# Patient Record
Sex: FEMALE | Race: BLACK OR AFRICAN AMERICAN | NOT HISPANIC OR LATINO
[De-identification: names, ages, dates, MRNs, and addresses within clinical notes are randomized per-mention and may not be internally consistent; named-entity substitution may affect disease eponyms.]

---

## 2021-06-26 ENCOUNTER — TRANSCRIPTION ENCOUNTER (OUTPATIENT)
Age: 61
End: 2021-06-26

## 2022-10-27 ENCOUNTER — NON-APPOINTMENT (OUTPATIENT)
Age: 62
End: 2022-10-27

## 2024-04-16 ENCOUNTER — NON-APPOINTMENT (OUTPATIENT)
Age: 64
End: 2024-04-16

## 2024-04-17 ENCOUNTER — NON-APPOINTMENT (OUTPATIENT)
Age: 64
End: 2024-04-17

## 2024-04-18 ENCOUNTER — NON-APPOINTMENT (OUTPATIENT)
Age: 64
End: 2024-04-18

## 2024-04-18 ENCOUNTER — APPOINTMENT (OUTPATIENT)
Dept: ORTHOPEDIC SURGERY | Facility: CLINIC | Age: 64
End: 2024-04-18
Payer: MEDICARE

## 2024-04-18 DIAGNOSIS — M75.41 IMPINGEMENT SYNDROME OF RIGHT SHOULDER: ICD-10-CM

## 2024-04-18 PROCEDURE — 20610 DRAIN/INJ JOINT/BURSA W/O US: CPT | Mod: RT

## 2024-04-18 PROCEDURE — 99203 OFFICE O/P NEW LOW 30 MIN: CPT | Mod: 25

## 2024-04-25 DIAGNOSIS — Z84.1 FAMILY HISTORY OF DISORDERS OF KIDNEY AND URETER: ICD-10-CM

## 2024-04-25 DIAGNOSIS — Z82.49 FAMILY HISTORY OF ISCHEMIC HEART DISEASE AND OTHER DISEASES OF THE CIRCULATORY SYSTEM: ICD-10-CM

## 2024-04-25 DIAGNOSIS — Z86.79 PERSONAL HISTORY OF OTHER DISEASES OF THE CIRCULATORY SYSTEM: ICD-10-CM

## 2024-04-25 DIAGNOSIS — Z83.511 FAMILY HISTORY OF GLAUCOMA: ICD-10-CM

## 2024-04-25 DIAGNOSIS — Z83.438 FAMILY HISTORY OF OTHER DISORDER OF LIPOPROTEIN METABOLISM AND OTHER LIPIDEMIA: ICD-10-CM

## 2024-05-02 PROBLEM — M75.41 IMPINGEMENT SYNDROME OF RIGHT SHOULDER: Status: ACTIVE | Noted: 2024-04-18

## 2024-05-08 ENCOUNTER — APPOINTMENT (OUTPATIENT)
Dept: ORTHOPEDIC SURGERY | Facility: CLINIC | Age: 64
End: 2024-05-08

## 2024-05-30 NOTE — PROCEDURE
[de-identified] : Consent: At this time I have recommended a subacromial injection to the right shoulder.  The risks and benefits of the procedure were discussed with the patient in detail.  Upon verbal consent of the patient, we proceeded with the injection as noted below.   Indications:  Impingement, right shoulder : [employer] Drug name: Triamcinolone Acetonide Injectable Suspension USP NDC#: [address]-1 Lot#: [id number] Expiration date: 08/31/25    Procedure: After a sterile prep, the patient underwent a subacromial injection of 9 cc of 1% Lidocaine without epinephrine and 1 cc of triamcinolone acetonide (40 mg/mL) into the right shoulder.  The patient tolerated the procedure well.  There were no complications.

## 2024-05-30 NOTE — HISTORY OF PRESENT ILLNESS
[8] : a current pain level of 8/10 [de-identified] : Gris comes in today with complaints referable to her right shoulder.  It has been going on for about a month and getting a little worse recently.  She had x-rays taken in the hospital.  This injury is not work related or due to an automobile accident.  The patient states the pain is constant.  The patient describes the pain as throbbing.  [de-identified] : Tylenol

## 2024-05-30 NOTE — ADDENDUM
[FreeTextEntry1] : This note was written by Irma Potts on 04/24/2024 acting as scribe for Faustino Meza III, MD

## 2024-05-30 NOTE — PHYSICAL EXAM
[de-identified] : Left shoulder: Shoulder: Range of Motion in Degrees:                                 Claimant:   Normal:    Abduction (Active)   180   180 degrees    Abduction (Passive)   180   180 degrees    Forward elevation (Active):   180   180 degrees    Forward elevation (Passive):  180   180 degrees    External rotation (Active):   45   45 degrees    External rotation (Passive):   45   45 degrees    Internal rotation (Active):   L-1   L-1    Internal rotation (Passive):   L-1   L-1    No motor weakness to internal rotation, external rotation or abduction in the scapular plane.  Negative crank test.  Negative Geneva's test.  Negative Speeds test. Negative Yergason's test.  Negative cross arm test.  No tenderness to palpation at the AC joint. Negative Prasad sign.  Negative Neer sign.  Negative apprehension. Negative sulcus sign.  No gross neurological or vascular deficits distally.  Skin is intact.  No rashes, scars or lesions. 2+ radial and ulnar pulses. No extra-articular swelling or tenderness.   Right shoulder: Shoulder: Range of Motion in Degrees:                                     Claimant: Normal:  Abduction (Active)                   180                180 degrees  Abduction (Passive)   180                180 degrees  Forward elevation (Active):   180                180 degrees  Forward elevation (Passive):   180                180 degrees  External rotation (Active):    45                45 degrees  External rotation (Passive):    45                45 degrees  Internal rotation (Active):    L-1                L-1  Internal rotation (Passive):    L-1                L-1    No motor weakness to internal rotation, external rotation or abduction in the scapular plane.  Negative crank test.  Negative Geneva's test.  Negative Speeds test. Negative Yergason's test.  Negative cross arm test.  No tenderness to palpation at the AC joint. Positive Prasad sign.  Positive Neer sign. Negative apprehension. Negative sulcus sign.  No gross neurological or vascular deficits distally.  Skin is intact.  No rashes, scars or lesions. 2+ radial and ulnar pulses. No extra-articular swelling or tenderness.   [de-identified] : Gait: Normal    Station: Normal  [de-identified] : Appearance: Well-developed, well-nourished female  in no acute distress.  [de-identified] : Review of outside radiographs show no obvious osseous abnormalities.

## 2025-04-15 ENCOUNTER — NON-APPOINTMENT (OUTPATIENT)
Age: 65
End: 2025-04-15

## 2025-04-15 ENCOUNTER — INPATIENT (INPATIENT)
Facility: HOSPITAL | Age: 65
LOS: 1 days | Discharge: HOME | DRG: 103 | End: 2025-04-17
Attending: HOSPITALIST | Admitting: HOSPITALIST
Payer: MEDICARE

## 2025-04-15 VITALS — WEIGHT: 169.98 LBS

## 2025-04-15 DIAGNOSIS — R42 DIZZINESS AND GIDDINESS: ICD-10-CM

## 2025-04-15 LAB
ALBUMIN SERPL ELPH-MCNC: 3.5 G/DL — SIGNIFICANT CHANGE UP (ref 3.3–5)
ALP SERPL-CCNC: 87 U/L — SIGNIFICANT CHANGE UP (ref 40–120)
ALT FLD-CCNC: 18 U/L — SIGNIFICANT CHANGE UP (ref 12–78)
ANION GAP SERPL CALC-SCNC: 4 MMOL/L — LOW (ref 5–17)
APPEARANCE UR: CLEAR — SIGNIFICANT CHANGE UP
APTT BLD: 27.8 SEC — SIGNIFICANT CHANGE UP (ref 24.5–35.6)
AST SERPL-CCNC: 15 U/L — SIGNIFICANT CHANGE UP (ref 15–37)
BASOPHILS # BLD AUTO: 0.02 K/UL — SIGNIFICANT CHANGE UP (ref 0–0.2)
BASOPHILS NFR BLD AUTO: 0.3 % — SIGNIFICANT CHANGE UP (ref 0–2)
BILIRUB SERPL-MCNC: 0.6 MG/DL — SIGNIFICANT CHANGE UP (ref 0.2–1.2)
BILIRUB UR-MCNC: NEGATIVE — SIGNIFICANT CHANGE UP
BUN SERPL-MCNC: 9 MG/DL — SIGNIFICANT CHANGE UP (ref 7–23)
CALCIUM SERPL-MCNC: 9.8 MG/DL — SIGNIFICANT CHANGE UP (ref 8.5–10.1)
CHLORIDE SERPL-SCNC: 112 MMOL/L — HIGH (ref 96–108)
CO2 SERPL-SCNC: 25 MMOL/L — SIGNIFICANT CHANGE UP (ref 22–31)
COLOR SPEC: YELLOW — SIGNIFICANT CHANGE UP
CREAT SERPL-MCNC: 0.7 MG/DL — SIGNIFICANT CHANGE UP (ref 0.5–1.3)
DIFF PNL FLD: NEGATIVE — SIGNIFICANT CHANGE UP
EGFR: 95 ML/MIN/1.73M2 — SIGNIFICANT CHANGE UP
EGFR: 95 ML/MIN/1.73M2 — SIGNIFICANT CHANGE UP
EOSINOPHIL # BLD AUTO: 0.06 K/UL — SIGNIFICANT CHANGE UP (ref 0–0.5)
EOSINOPHIL NFR BLD AUTO: 0.8 % — SIGNIFICANT CHANGE UP (ref 0–6)
ETHANOL SERPL-MCNC: <10 MG/DL — SIGNIFICANT CHANGE UP (ref 0–10)
FLUAV AG NPH QL: SIGNIFICANT CHANGE UP
FLUBV AG NPH QL: SIGNIFICANT CHANGE UP
GLUCOSE SERPL-MCNC: 107 MG/DL — HIGH (ref 70–99)
GLUCOSE UR QL: NEGATIVE MG/DL — SIGNIFICANT CHANGE UP
HCT VFR BLD CALC: 45.1 % — HIGH (ref 34.5–45)
HGB BLD-MCNC: 15.4 G/DL — SIGNIFICANT CHANGE UP (ref 11.5–15.5)
IMM GRANULOCYTES # BLD AUTO: 0.02 K/UL — SIGNIFICANT CHANGE UP (ref 0–0.07)
IMM GRANULOCYTES NFR BLD AUTO: 0.3 % — SIGNIFICANT CHANGE UP (ref 0–0.9)
INR BLD: 0.91 RATIO — SIGNIFICANT CHANGE UP (ref 0.85–1.16)
KETONES UR-MCNC: NEGATIVE MG/DL — SIGNIFICANT CHANGE UP
LEUKOCYTE ESTERASE UR-ACNC: NEGATIVE — SIGNIFICANT CHANGE UP
LYMPHOCYTES # BLD AUTO: 1.43 K/UL — SIGNIFICANT CHANGE UP (ref 1–3.3)
LYMPHOCYTES NFR BLD AUTO: 19.5 % — SIGNIFICANT CHANGE UP (ref 13–44)
MCHC RBC-ENTMCNC: 28.9 PG — SIGNIFICANT CHANGE UP (ref 27–34)
MCHC RBC-ENTMCNC: 34.1 G/DL — SIGNIFICANT CHANGE UP (ref 32–36)
MCV RBC AUTO: 84.8 FL — SIGNIFICANT CHANGE UP (ref 80–100)
MONOCYTES # BLD AUTO: 0.59 K/UL — SIGNIFICANT CHANGE UP (ref 0–0.9)
MONOCYTES NFR BLD AUTO: 8 % — SIGNIFICANT CHANGE UP (ref 2–14)
NEUTROPHILS # BLD AUTO: 5.23 K/UL — SIGNIFICANT CHANGE UP (ref 1.8–7.4)
NEUTROPHILS NFR BLD AUTO: 71.1 % — SIGNIFICANT CHANGE UP (ref 43–77)
NITRITE UR-MCNC: NEGATIVE — SIGNIFICANT CHANGE UP
NRBC # BLD AUTO: 0 K/UL — SIGNIFICANT CHANGE UP (ref 0–0)
NRBC # FLD: 0 K/UL — SIGNIFICANT CHANGE UP (ref 0–0)
NRBC BLD AUTO-RTO: 0 /100 WBCS — SIGNIFICANT CHANGE UP (ref 0–0)
PH UR: 7 — SIGNIFICANT CHANGE UP (ref 5–8)
PLATELET # BLD AUTO: 181 K/UL — SIGNIFICANT CHANGE UP (ref 150–400)
PMV BLD: 12.9 FL — SIGNIFICANT CHANGE UP (ref 7–13)
POTASSIUM SERPL-MCNC: 3.7 MMOL/L — SIGNIFICANT CHANGE UP (ref 3.5–5.3)
POTASSIUM SERPL-SCNC: 3.7 MMOL/L — SIGNIFICANT CHANGE UP (ref 3.5–5.3)
PROT SERPL-MCNC: 7.5 GM/DL — SIGNIFICANT CHANGE UP (ref 6–8.3)
PROT UR-MCNC: NEGATIVE MG/DL — SIGNIFICANT CHANGE UP
PROTHROM AB SERPL-ACNC: 10.8 SEC — SIGNIFICANT CHANGE UP (ref 9.9–13.4)
RBC # BLD: 5.32 M/UL — HIGH (ref 3.8–5.2)
RBC # FLD: 13.5 % — SIGNIFICANT CHANGE UP (ref 10.3–14.5)
RSV RNA NPH QL NAA+NON-PROBE: SIGNIFICANT CHANGE UP
SARS-COV-2 RNA SPEC QL NAA+PROBE: SIGNIFICANT CHANGE UP
SODIUM SERPL-SCNC: 141 MMOL/L — SIGNIFICANT CHANGE UP (ref 135–145)
SOURCE RESPIRATORY: SIGNIFICANT CHANGE UP
SP GR SPEC: 1.02 — SIGNIFICANT CHANGE UP (ref 1–1.03)
TROPONIN I, HIGH SENSITIVITY RESULT: 3.68 NG/L — SIGNIFICANT CHANGE UP
UROBILINOGEN FLD QL: 0.2 MG/DL — SIGNIFICANT CHANGE UP (ref 0.2–1)
WBC # BLD: 7.35 K/UL — SIGNIFICANT CHANGE UP (ref 3.8–10.5)
WBC # FLD AUTO: 7.35 K/UL — SIGNIFICANT CHANGE UP (ref 3.8–10.5)

## 2025-04-15 PROCEDURE — 80061 LIPID PANEL: CPT

## 2025-04-15 PROCEDURE — 71045 X-RAY EXAM CHEST 1 VIEW: CPT | Mod: 26

## 2025-04-15 PROCEDURE — 70450 CT HEAD/BRAIN W/O DYE: CPT | Mod: 26,XU

## 2025-04-15 PROCEDURE — 83735 ASSAY OF MAGNESIUM: CPT

## 2025-04-15 PROCEDURE — 70551 MRI BRAIN STEM W/O DYE: CPT | Mod: MC

## 2025-04-15 PROCEDURE — 80048 BASIC METABOLIC PNL TOTAL CA: CPT

## 2025-04-15 PROCEDURE — 85652 RBC SED RATE AUTOMATED: CPT

## 2025-04-15 PROCEDURE — 99291 CRITICAL CARE FIRST HOUR: CPT

## 2025-04-15 PROCEDURE — 85027 COMPLETE CBC AUTOMATED: CPT

## 2025-04-15 PROCEDURE — 99284 EMERGENCY DEPT VISIT MOD MDM: CPT

## 2025-04-15 PROCEDURE — 99285 EMERGENCY DEPT VISIT HI MDM: CPT

## 2025-04-15 PROCEDURE — 70496 CT ANGIOGRAPHY HEAD: CPT | Mod: 26

## 2025-04-15 PROCEDURE — 99222 1ST HOSP IP/OBS MODERATE 55: CPT

## 2025-04-15 PROCEDURE — 0042T: CPT

## 2025-04-15 PROCEDURE — 84100 ASSAY OF PHOSPHORUS: CPT

## 2025-04-15 PROCEDURE — 36415 COLL VENOUS BLD VENIPUNCTURE: CPT

## 2025-04-15 PROCEDURE — 70498 CT ANGIOGRAPHY NECK: CPT | Mod: 26

## 2025-04-15 PROCEDURE — 93306 TTE W/DOPPLER COMPLETE: CPT

## 2025-04-15 PROCEDURE — 70551 MRI BRAIN STEM W/O DYE: CPT | Mod: 26

## 2025-04-15 RX ORDER — AMLODIPINE BESYLATE 10 MG/1
1 TABLET ORAL
Refills: 0 | DISCHARGE

## 2025-04-15 RX ORDER — ACETAMINOPHEN 500 MG/5ML
650 LIQUID (ML) ORAL EVERY 6 HOURS
Refills: 0 | Status: DISCONTINUED | OUTPATIENT
Start: 2025-04-15 | End: 2025-04-17

## 2025-04-15 RX ORDER — ASPIRIN 325 MG
81 TABLET ORAL DAILY
Refills: 0 | Status: DISCONTINUED | OUTPATIENT
Start: 2025-04-16 | End: 2025-04-17

## 2025-04-15 RX ORDER — ATORVASTATIN CALCIUM 80 MG/1
80 TABLET, FILM COATED ORAL AT BEDTIME
Refills: 0 | Status: DISCONTINUED | OUTPATIENT
Start: 2025-04-15 | End: 2025-04-17

## 2025-04-15 RX ORDER — BRIMONIDINE TARTRATE 1.5 MG/ML
1 SOLUTION/ DROPS OPHTHALMIC
Refills: 0 | Status: DISCONTINUED | OUTPATIENT
Start: 2025-04-15 | End: 2025-04-17

## 2025-04-15 RX ORDER — ONDANSETRON HCL/PF 4 MG/2 ML
4 VIAL (ML) INJECTION ONCE
Refills: 0 | Status: COMPLETED | OUTPATIENT
Start: 2025-04-15 | End: 2025-04-15

## 2025-04-15 RX ORDER — LATANOPROST PF 0.05 MG/ML
1 SOLUTION/ DROPS OPHTHALMIC AT BEDTIME
Refills: 0 | Status: DISCONTINUED | OUTPATIENT
Start: 2025-04-15 | End: 2025-04-17

## 2025-04-15 RX ORDER — MECLIZINE HCL 12.5 MG
12.5 TABLET ORAL EVERY 8 HOURS
Refills: 0 | Status: DISCONTINUED | OUTPATIENT
Start: 2025-04-15 | End: 2025-04-17

## 2025-04-15 RX ORDER — BRIMONIDINE TARTRATE 1.5 MG/ML
1 SOLUTION/ DROPS OPHTHALMIC
Refills: 0 | DISCHARGE

## 2025-04-15 RX ORDER — LATANOPROST PF 0.05 MG/ML
1 SOLUTION/ DROPS OPHTHALMIC
Refills: 0 | DISCHARGE

## 2025-04-15 RX ORDER — LORAZEPAM 4 MG/ML
0.5 VIAL (ML) INJECTION ONCE
Refills: 0 | Status: DISCONTINUED | OUTPATIENT
Start: 2025-04-15 | End: 2025-04-15

## 2025-04-15 RX ORDER — ASPIRIN 325 MG
324 TABLET ORAL ONCE
Refills: 0 | Status: COMPLETED | OUTPATIENT
Start: 2025-04-15 | End: 2025-04-15

## 2025-04-15 RX ORDER — AMLODIPINE BESYLATE 10 MG/1
2.5 TABLET ORAL DAILY
Refills: 0 | Status: DISCONTINUED | OUTPATIENT
Start: 2025-04-15 | End: 2025-04-17

## 2025-04-15 RX ADMIN — Medication 4 MILLIGRAM(S): at 11:51

## 2025-04-15 RX ADMIN — Medication 650 MILLIGRAM(S): at 21:00

## 2025-04-15 RX ADMIN — ATORVASTATIN CALCIUM 80 MILLIGRAM(S): 80 TABLET, FILM COATED ORAL at 21:03

## 2025-04-15 RX ADMIN — Medication 12.5 MILLIGRAM(S): at 16:59

## 2025-04-15 RX ADMIN — Medication 75 MILLILITER(S): at 17:00

## 2025-04-15 RX ADMIN — Medication 650 MILLIGRAM(S): at 20:30

## 2025-04-15 RX ADMIN — BRIMONIDINE TARTRATE 1 DROP(S): 1.5 SOLUTION/ DROPS OPHTHALMIC at 22:26

## 2025-04-15 RX ADMIN — Medication 0.5 MILLIGRAM(S): at 21:03

## 2025-04-15 RX ADMIN — Medication 324 MILLIGRAM(S): at 14:31

## 2025-04-15 RX ADMIN — Medication 1000 MILLILITER(S): at 11:51

## 2025-04-15 RX ADMIN — LATANOPROST PF 1 DROP(S): 0.05 SOLUTION/ DROPS OPHTHALMIC at 22:26

## 2025-04-15 NOTE — PATIENT PROFILE ADULT - FALL HARM RISK - HARM RISK INTERVENTIONS
Assistance with ambulation/Assistance OOB with selected safe patient handling equipment/Communicate Risk of Fall with Harm to all staff/Monitor gait and stability/Reinforce activity limits and safety measures with patient and family/Sit up slowly, dangle for a short time, stand at bedside before walking/Tailored Fall Risk Interventions/Visual Cue: Yellow wristband and red socks/Bed in lowest position, wheels locked, appropriate side rails in place/Call bell, personal items and telephone in reach/Instruct patient to call for assistance before getting out of bed or chair/Non-slip footwear when patient is out of bed/[place] to call system/Physically safe environment - no spills, clutter or unnecessary equipment/Purposeful Proactive Rounding/Room/bathroom lighting operational, light cord in reach

## 2025-04-15 NOTE — H&P ADULT - ASSESSMENT
#Dizziness : BPPV vs VBI  - admit to medicine/tele  - CTA noted, incidental small saccular aneurysm noted. No acute cva or lvo  - ASA + statin  - TTE  - MRI  - Neuro checks q4h  - BGM q6h x 24 hours  - lipid/a1c  - Fall precautions  - meclizine prn  - gentle ivf  - DVT px: SCDs      Time spent: > 75 min spent during this encounter including  but not limited to chart review, labs/imaging, coordination of care and discussion with consultants.  D/W ED provider

## 2025-04-15 NOTE — H&P ADULT - NSHPREVIEWOFSYSTEMS_GEN_ALL_CORE
REVIEW OF SYSTEMS:    CONSTITUTIONAL: No weakness, fevers or chills. + dizziness  EYES/ENT: No visual changes;  No vertigo or throat pain   NECK: No pain or stiffness  RESPIRATORY: No cough, wheezing, hemoptysis; No shortness of breath  CARDIOVASCULAR: No chest pain or palpitations  GASTROINTESTINAL: No abdominal or epigastric pain. No nausea, vomiting, or hematemesis; No diarrhea or constipation. No melena or hematochezia.  GENITOURINARY: No dysuria, frequency or hematuria  NEUROLOGICAL: No numbness or weakness  SKIN: No itching, burning, rashes, or lesions   All other review of systems is negative unless indicated above

## 2025-04-15 NOTE — H&P ADULT - NSHPLABSRESULTS_GEN_ALL_CORE
LABS: All Labs Reviewed:                        15.4   7.35  )-----------( 181      ( 15 Apr 2025 10:56 )             45.1     04-15    141  |  112[H]  |  9   ----------------------------<  107[H]  3.7   |  25  |  0.70    Ca    9.8      15 Apr 2025 10:56    TPro  7.5  /  Alb  3.5  /  TBili  0.6  /  DBili  x   /  AST  15  /  ALT  18  /  AlkPhos  87  04-15    PT/INR - ( 15 Apr 2025 10:56 )   PT: 10.8 sec;   INR: 0.91 ratio         PTT - ( 15 Apr 2025 10:56 )  PTT:27.8 sec          Blood Culture:           < from: CT Angio Neck Stroke Protocol w/ IV Cont (04.15.25 @ 11:10) >        IMPRESSION:    1.   RIGHT CAROTID SYSTEM:    No hemodynamically significant stenosis.    2.   LEFT CAROTID SYSTEM:     No hemodynamically significant stenosis.    3.   VERTEBRAL CIRCULATION:    Patent.    4.  ANTERIOR INTRACRANIAL CIRCULATION:     Right internal carotid tiny   saccular aneurysm (versus infundibulum of a branch vessel not otherwise   identified).    5.  POSTERIOR INTRACRANIAL CIRCULATION:    Unremarkable.    6.  No large vessel occlusion.    7.  BRAIN PERFUSION:   No acute infarction of the brain is convincingly   demonstrated.    --- End of Report ---      < end of copied text >

## 2025-04-15 NOTE — ED ADULT NURSE NOTE - OBJECTIVE STATEMENT
65 y/o F presenting to ED with c/o feeling like she was off balanced to the right while walking, feeling of spinning sensation in her head, photosensitivity, nauseous, dizziness. symptoms onset 0800. denies blurry vision, difficulty speaking, facial droop, numbness/tingling. denies previous episodes of this. non-smoker. currently endorsing a headache described as "not too bad". denies migraine hx. wearing cardiac monitor tracing sinus bradycardia. Hx: high blood pressure, glaucoma. BGL 95 upon arrival to ED. code stroke called 1053, patient taken directly to CT scan.

## 2025-04-15 NOTE — ED PROVIDER NOTE - OBJECTIVE STATEMENT
65 y/o female with a PMHx of hypertension and glaucoma presents to the ED c/o dizziness. Pt states the room is spinning,  last known time was 8am this morning. Prior to 8am pt was at work when the room started spinning feeling nauseous, difficulty ambulating and blurred vision. Pt denies any trauma, notes she had a headaches no focal weakness, numbness or tingling. Pt is accompanied by family and hx limited secondary to acuity. See mdm

## 2025-04-15 NOTE — ED PROVIDER NOTE - CLINICAL SUMMARY MEDICAL DECISION MAKING FREE TEXT BOX
63 y/o F presents for dizziness. Stroke note activated for vertigo, visual symptoms differential includes stroke, ICH, labs, urine, flu swab, CXR, EKG, and reassess. 65 y/o female with a PMHx of hypertension and glaucoma presents to the ED c/o dizziness. Pt states the room is spinning,  last known time was 8am this morning. Prior to 8am pt was at work when the room started spinning feeling nauseous, difficulty ambulating and blurred vision. Pt denies any trauma, notes she had a headaches no focal weakness, numbness or tingling. Pt is accompanied by family and hx limited secondary to acuity.    MDM: 65 y/o F presents for dizziness. Stroke note activated for vertigo, visual symptoms differential includes stroke, ICH, labs, urine, flu swab, CXR, EKG, and reassess.

## 2025-04-15 NOTE — STROKE CODE NOTE - NSMDCONSULT QTN_Y FT
CC: code stroke    HPI: 63 y/o female with h/o HTN, glaucoma presents to the ED with c/o dizziness.  Pt states her head is spinning.  She woke up at 6:30 fine, went to work and at 8am the room started spinning, felt nauseous, felt unsteady and leaning more to the L with blurred vision.  Denies focal weakness, numbness, dysarthria, facial droop, diplopia, tinnitus, ear pain, recent travel, recent illness.  +photohobia, +increased stress as she takes care of her ill brother.  Code stroke was called in the ED.  NIHSS 0.  CT imaging was neg for acute stroke/bleed, LVO, perfusion mismatch.  Incidental DAVID tiny saccular aneurysm vs infundibulum. The patient was not a candidate for IV thrombolytics because of no measurable deficits, and not a thrombectomy candidate because no LVO.          PAST MEDICAL & SURGICAL HISTORY:  HTN  Glaucoma    FAMILY HISTORY: Brother 69  brain aneurysm, Sister 56 brain aneurysm      Social Hx:  Nonsmoker, no drug or alcohol use    MEDICATIONS  (STANDING):  AMlodipine  glaucoma eye drops  D3    Allergies  latex (Unknown)  penicillin (Rash)      ROS: Pertinent positives in HPI, all other ROS were reviewed and are negative.      Vital Signs Last 24 Hrs  T(C): 36.8 (15 Apr 2025 10:52), Max: 36.8 (15 Apr 2025 10:52)  T(F): 98.2 (15 Apr 2025 10:52), Max: 98.2 (15 Apr 2025 10:52)  HR: 55 (15 Apr 2025 11:35) (55 - 58)  BP: 166/107 (15 Apr 2025 11:35) (166/107 - 167/94)  BP(mean): 119 (15 Apr 2025 11:35) (110 - 119)  RR: 16 (15 Apr 2025 11:35) (16 - 18)  SpO2: 98% (15 Apr 2025 11:35) (97% - 98%)    Parameters below as of 15 Apr 2025 11:35  Patient On (Oxygen Delivery Method): room air      GEN:  Constitutional: awake, NAD, eyes closed  HEAD: Normocephalic  Neck: Supple.  Extremities:  no edema  Musculoskeletal: no abnormal movements  Skin: No rashes    Neurological exam:  HF: A x O x 3. Appropriately interactive, normal affect. Speech fluent, No Aphasia or paraphasic errors. Naming /repetition intact   CN: EMBER, EOMI, VFF, nystagmus L, +saccade on HINTS, facial sensation normal, no NLFD, tongue midline, Palate moves equally  Motor: No pronator drift, Strength 5/5 in all 4 ext, normal bulk and tone, no tremor, rigidity or bradykinesia.    Sens: Intact to light touch  Reflexes: Symmetric and normal, downgoing toes b/l  Coord:  No FNFA, dysmetria, MICHAEL intact   Gait/Balance: Neg Romberg's, took a few steps, felt dizzy/vertigo, but no ataxia observed    NIHSS: 0          Labs:   04-15    141  |  112[H]  |  9   ----------------------------<  107[H]  3.7   |  25  |  0.70    Ca    9.8      15 Apr 2025 10:56    TPro  7.5  /  Alb  3.5  /  TBili  0.6  /  DBili  x   /  AST  15  /  ALT  18  /  AlkPhos  87  04-15                              15.4   7.35  )-----------( 181      ( 15 Apr 2025 10:56 )             45.1       Radiology:  < from: CT Angio Neck Stroke Protocol w/ IV Cont (04.15.25 @ 11:10) >      IMPRESSION:    1.   RIGHT CAROTID SYSTEM:    No hemodynamically significant stenosis.    2.   LEFT CAROTID SYSTEM:     No hemodynamically significant stenosis.    3.   VERTEBRAL CIRCULATION:    Patent.    4.  ANTERIOR INTRACRANIAL CIRCULATION:     Right internal carotid tiny   saccular aneurysm (versus infundibulum of a branch vessel not otherwise   identified).    5.  POSTERIOR INTRACRANIAL CIRCULATION:    Unremarkable.    6.  No large vessel occlusion.    7.  BRAIN PERFUSION:   No acute infarction of the brain is convincingly   demonstrated.        A/P: 63 y/o female with h/o HTN, glaucoma presents to the ED with c/o vertigo, HA, blurry vision, unsteady gait.  code stroke called.  NIHSS 0.  CT imaging was neg for stroke, bleed, LVO, perfusion mismatch.   Incidental DAVID tiny saccular aneurysm vs infundibulum. The patient was not a candidate for IV thrombolytics because of no measurable deficits, and not a thrombectomy candidate because no LVO.  PE no focal deficits, +L nystagmus, +saccade on HINTS.      #Likely peripheral vertigo/vertiginous migraine-r/o cerebellar stroke      Recommendations  -Monitor on tele  -Neurochecks/VS q 4  -Gradual normotension  -Load with ASA  -HD statin  -DVT prophylaxis  -MRI brain  -Dysphagia screen stat and if fails speech and swallow eval  -PT eval  -Neurology to follow.  Please page or call for any questions CC: code stroke    HPI:   65 y/o female with PMHx of HTN, glaucoma presents to the ED with c/o dizziness. Pt states she woke up at 6:30 fine, went to work and at 8 am the room started spinning, felt nauseous, felt unsteady and was leaning more to the L, had blurred vision and some photophobia.  Denies focal weakness, numbness, dysarthria, facial droop, diplopia, tinnitus, ear pain, recent travel, recent illness. Has increased stress as she takes care of her ill brother.  Code stroke was called in the ED. NIHSS 0.  CT imaging was neg for acute stroke/bleed, CTA no LVO, CTP no perfusion mismatch.  Incidental DAVID tiny saccular aneurysm vs infundibulum. The patient was not a candidate for IV thrombolytics because of no measurable deficits, and not a thrombectomy candidate because no LVO.          PAST MEDICAL & SURGICAL HISTORY:  HTN  Glaucoma    FAMILY HISTORY: Brother 69  brain aneurysm, Sister 56 brain aneurysm      Social Hx:  Nonsmoker, no drug or alcohol use    MEDICATIONS  (STANDING):  AMlodipine  glaucoma eye drops  D3    Allergies  latex (Unknown)  penicillin (Rash)      ROS: Pertinent positives in HPI, all other ROS were reviewed and are negative.      Vital Signs Last 24 Hrs  T(C): 36.8 (15 Apr 2025 10:52), Max: 36.8 (15 Apr 2025 10:52)  T(F): 98.2 (15 Apr 2025 10:52), Max: 98.2 (15 Apr 2025 10:52)  HR: 55 (15 Apr 2025 11:35) (55 - 58)  BP: 166/107 (15 Apr 2025 11:35) (166/107 - 167/94)  BP(mean): 119 (15 Apr 2025 11:35) (110 - 119)  RR: 16 (15 Apr 2025 11:35) (16 - 18)  SpO2: 98% (15 Apr 2025 11:35) (97% - 98%)    Parameters below as of 15 Apr 2025 11:35  Patient On (Oxygen Delivery Method): room air      GEN:  Constitutional: awake, NAD, eyes closed  HEAD: Normocephalic  Neck: Supple.  Extremities:  no edema  Musculoskeletal: no abnormal movements  Skin: No rashes    Neurological exam:  HF: A x O x 3. Appropriately interactive, normal affect. Speech fluent, No Aphasia or paraphasic errors. Naming /repetition intact   CN: EMBER, EOMI, VFF, left beating nystagmus +saccade on HINTS, facial sensation normal, no NLFD, tongue midline, Palate moves equally  Motor: No pronator drift, Strength 5/5 in all 4 ext, normal bulk and tone, no tremor, rigidity or bradykinesia.    Sens: Intact to light touch  Reflexes: Symmetric and normal, downgoing toes b/l  Coord:  No FNFA, dysmetria, MICHAEL intact   Gait/Balance: Neg Romberg's, took a few steps, felt dizzy but no ataxia observed    NIHSS: 0          Labs:   04-15    141  |  112[H]  |  9   ----------------------------<  107[H]  3.7   |  25  |  0.70    Ca    9.8      15 Apr 2025 10:56    TPro  7.5  /  Alb  3.5  /  TBili  0.6  /  DBili  x   /  AST  15  /  ALT  18  /  AlkPhos  87  04-15                              15.4   7.35  )-----------( 181      ( 15 Apr 2025 10:56 )             45.1       Radiology:  < from: CT Angio Neck Stroke Protocol w/ IV Cont (04.15.25 @ 11:10) >      IMPRESSION:    1.   RIGHT CAROTID SYSTEM:    No hemodynamically significant stenosis.    2.   LEFT CAROTID SYSTEM:     No hemodynamically significant stenosis.    3.   VERTEBRAL CIRCULATION:    Patent.    4.  ANTERIOR INTRACRANIAL CIRCULATION:     Right internal carotid tiny   saccular aneurysm (versus infundibulum of a branch vessel not otherwise   identified).    5.  POSTERIOR INTRACRANIAL CIRCULATION:    Unremarkable.    6.  No large vessel occlusion.    7.  BRAIN PERFUSION:   No acute infarction of the brain is convincingly   demonstrated.        A/P:  -----  65 y/o female with PMHx of HTN, glaucoma presents to the ED with c/o dizziness. Pt woke up at 6:30 fine, went to work, at 8 am the room started spinning, felt nauseous, unsteady, was leaning  to the L, had blurred vision and some photophobia.  Code stroke was called in the ED. NIHSS 0.  CT head neg for acute stroke/bleed, CTA no LVO, CTP no perfusion mismatch.  Incidental DAVID tiny saccular aneurysm vs infundibulum. The patient was not a candidate for IV thrombolytics because of no measurable deficits, and not a thrombectomy candidate because no LVO.   PE no focal deficits, L beating nystagmus, +saccade on HINTS.      #Likely peripheral vertigo/vertiginous migraine; rule out brain stem CVA - given HTN      Recommendations  -Monitor on tele  -Neurochecks/VS q 4  -Gradual normotension  -Load with ASA  -HD statin  -DVT prophylaxis  -MRI brain  -Dysphagia screen stat and if fails speech and swallow eval  -PT eval  -Neurology to follow.     Neurology attending  ------------------------------  Patient seen and examined, was involved in decision making  Discussed with Dr. Marroquin

## 2025-04-15 NOTE — ED PROVIDER NOTE - PROGRESS NOTE DETAILS
[facility] Dr. Ngo   Pt CT. labs and urine unremarkable. Pt will be admitted for MRI

## 2025-04-15 NOTE — H&P ADULT - NSHPPHYSICALEXAM_GEN_ALL_CORE
ICU Vital Signs Last 24 Hrs  T(C): 36.6 (15 Apr 2025 13:56), Max: 36.8 (15 Apr 2025 10:52)  T(F): 97.8 (15 Apr 2025 13:56), Max: 98.2 (15 Apr 2025 10:52)  HR: 64 (15 Apr 2025 15:00) (52 - 67)  BP: 163/86 (15 Apr 2025 15:00) (157/85 - 167/94)  BP(mean): 109 (15 Apr 2025 15:00) (108 - 119)  ABP: --  ABP(mean): --  RR: 17 (15 Apr 2025 15:00) (15 - 18)  SpO2: 97% (15 Apr 2025 15:00) (97% - 100%)    O2 Parameters below as of 15 Apr 2025 15:00  Patient On (Oxygen Delivery Method): room air            PHYSICAL EXAM:    Constitutional: NAD, awake and alert  HEENT: PERR, EOMI, Normal Hearing, MMM  Neck: Soft and supple, No LAD, No JVD  Respiratory: Breath sounds are clear bilaterally, No wheezing, rales or rhonchi  Cardiovascular: S1 and S2, regular rate and rhythm, no Murmurs, gallops or rubs  Gastrointestinal: Bowel Sounds present, soft, nontender, nondistended, no guarding, no rebound  Extremities: No peripheral edema  Vascular: 2+ peripheral pulses  Neurological: A/O x 3, no focal deficits  Musculoskeletal: 5/5 strength b/l upper and lower extremities; motor 5/5 throughout. normal sensory exam; normal cerebellar exam; cn 2-12 in tact  Skin: No rashes

## 2025-04-15 NOTE — H&P ADULT - HISTORY OF PRESENT ILLNESS
"63 y/o female with PMHx of HTN, glaucoma presents to the ED with c/o dizziness. Pt states she woke up at 6:30 fine, went to work and at 8 am the room started spinning, felt nauseous, felt unsteady and was leaning more to the L, had blurred vision and some photophobia.  Denies focal weakness, numbness, dysarthria, facial droop, diplopia, tinnitus, ear pain, recent travel, recent illness. Has increased stress as she takes care of her ill brother.  Code stroke was called in the ED. NIHSS 0.  CT imaging was neg for acute stroke/bleed, CTA no LVO, CTP no perfusion mismatch.  Incidental DAVID tiny saccular aneurysm vs infundibulum. The patient was not a candidate for IV thrombolytics because of no measurable deficits, and not a thrombectomy candidate because no LVO.  "    Pt seen and examined with family at bedside - states her dizziness began at 0800 today. Feels like the room is spinning. Worse with movt but still present when she is laying down  No neurological deficits. No facial droop, no dysarthria or aphasia. No blurry vision. No recent uri. No head trauma. No neck pain.   Neuro exam completely non focal in ED    EKG: SB, no stt changes          PAST MEDICAL & SURGICAL HISTORY:  HTN  Glaucoma    FAMILY HISTORY: Brother 69  brain aneurysm, Sister 56 brain aneurysm      Social Hx:  Nonsmoker, no drug or alcohol use

## 2025-04-15 NOTE — ED PROVIDER NOTE - NIH STROKE SCALE: 9. BEST LANGUAGE, QM
Reason for visit: Neurogenic bladder     Relevant information: Chart reviewed. Pt scheduled to establish care.      Records/imaging/labs/orders: Pt is due for imaging - last imaging that included kidneys is dated 11/09/21.Renal US orders placed.    Pt called: No answer on call attempt. Message routed to scheduling team to schedule imaging and reschedule pt to Yvonne May NP as this is likely not a surgical referral.     At Rooming: alen Perdomo CMA  01/12/24  10:55 AM     (0) No aphasia; normal

## 2025-04-15 NOTE — ED ADULT TRIAGE NOTE - CHIEF COMPLAINT QUOTE
Patient presents to the ER with complaints of dizziness, feeling like the room is spinning, nausea, headache, blurry vision since 0800 today. Hx: high blood pressure, glaucoma. Denies blood thinner use. Blood glucose 95. MD Amanda sadler in triage, code stroke called 1053.

## 2025-04-15 NOTE — ED PROVIDER NOTE - NIH STROKE SCALE: TOTAL
0
pt reports 2 hrs PTA as she was trying to fall asleep she felt rapid heart beat, bilateral hand tingling and anxious. Pt reports symptoms improving since arrival. Pt states "I just need help sleeping."

## 2025-04-15 NOTE — ED PROVIDER NOTE - PHYSICAL EXAMINATION
Constitutional: NAD, alert, verbal  HEENT: NCAT, EOMi, PERRL  Cardiac: RRR no MRG  Resp: clear, no wheezing or crackles  GI: ab soft ntnd, no r/g  MSK/Ext: no edema  Neuro: no facial droop, upper and lower ext 5/5 strength, sensation grossly intact, MICHAEL/FTN normal, no drift, no slurred speech  Skin: No rashes

## 2025-04-15 NOTE — ED ADULT NURSE NOTE - NSFALLRISKINTERV_ED_ALL_ED

## 2025-04-15 NOTE — ED ADULT NURSE NOTE - NS ED NURSE LEVEL OF CONSCIOUSNESS AFFECT
Pt here with right knee pain, no known injury , just standing and felt a pop.         Triage Assessment     Row Name 05/20/23 2018       Triage Assessment (Adult)    Airway WDL WDL       Respiratory WDL    Respiratory WDL WDL               Calm/Appropriate

## 2025-04-16 ENCOUNTER — RESULT REVIEW (OUTPATIENT)
Age: 65
End: 2025-04-16

## 2025-04-16 LAB
ANION GAP SERPL CALC-SCNC: 2 MMOL/L — LOW (ref 5–17)
BUN SERPL-MCNC: 10 MG/DL — SIGNIFICANT CHANGE UP (ref 7–23)
CALCIUM SERPL-MCNC: 9.2 MG/DL — SIGNIFICANT CHANGE UP (ref 8.5–10.1)
CHLORIDE SERPL-SCNC: 115 MMOL/L — HIGH (ref 96–108)
CHOLEST SERPL-MCNC: 172 MG/DL — SIGNIFICANT CHANGE UP
CO2 SERPL-SCNC: 26 MMOL/L — SIGNIFICANT CHANGE UP (ref 22–31)
CREAT SERPL-MCNC: 0.73 MG/DL — SIGNIFICANT CHANGE UP (ref 0.5–1.3)
EGFR: 91 ML/MIN/1.73M2 — SIGNIFICANT CHANGE UP
EGFR: 91 ML/MIN/1.73M2 — SIGNIFICANT CHANGE UP
GLUCOSE SERPL-MCNC: 98 MG/DL — SIGNIFICANT CHANGE UP (ref 70–99)
HDLC SERPL-MCNC: 52 MG/DL — SIGNIFICANT CHANGE UP
LDLC SERPL-MCNC: 100 MG/DL — HIGH
LIPID PNL WITH DIRECT LDL SERPL: 100 MG/DL — HIGH
NONHDLC SERPL-MCNC: 121 MG/DL — SIGNIFICANT CHANGE UP
POTASSIUM SERPL-MCNC: 3.9 MMOL/L — SIGNIFICANT CHANGE UP (ref 3.5–5.3)
POTASSIUM SERPL-SCNC: 3.9 MMOL/L — SIGNIFICANT CHANGE UP (ref 3.5–5.3)
SODIUM SERPL-SCNC: 143 MMOL/L — SIGNIFICANT CHANGE UP (ref 135–145)
TRIGL SERPL-MCNC: 114 MG/DL — SIGNIFICANT CHANGE UP

## 2025-04-16 PROCEDURE — 99233 SBSQ HOSP IP/OBS HIGH 50: CPT

## 2025-04-16 PROCEDURE — 99232 SBSQ HOSP IP/OBS MODERATE 35: CPT

## 2025-04-16 PROCEDURE — 93306 TTE W/DOPPLER COMPLETE: CPT | Mod: 26

## 2025-04-16 RX ORDER — BUTALBITAL, ACETAMINOPHEN AND CAFFEINE 50; 325; 40 MG/1; MG/1; MG/1
1 TABLET ORAL ONCE
Refills: 0 | Status: COMPLETED | OUTPATIENT
Start: 2025-04-16 | End: 2025-04-16

## 2025-04-16 RX ORDER — DEXAMETHASONE 0.5 MG/1
10 TABLET ORAL ONCE
Refills: 0 | Status: COMPLETED | OUTPATIENT
Start: 2025-04-16 | End: 2025-04-16

## 2025-04-16 RX ORDER — METOCLOPRAMIDE HCL 10 MG
10 TABLET ORAL ONCE
Refills: 0 | Status: COMPLETED | OUTPATIENT
Start: 2025-04-16 | End: 2025-04-16

## 2025-04-16 RX ORDER — DEXAMETHASONE 0.5 MG/1
10 TABLET ORAL ONCE
Refills: 0 | Status: DISCONTINUED | OUTPATIENT
Start: 2025-04-16 | End: 2025-04-16

## 2025-04-16 RX ORDER — MAGNESIUM SULFATE 500 MG/ML
1 SYRINGE (ML) INJECTION ONCE
Refills: 0 | Status: COMPLETED | OUTPATIENT
Start: 2025-04-16 | End: 2025-04-16

## 2025-04-16 RX ADMIN — BUTALBITAL, ACETAMINOPHEN AND CAFFEINE 1 TABLET(S): 50; 325; 40 TABLET ORAL at 13:30

## 2025-04-16 RX ADMIN — LATANOPROST PF 1 DROP(S): 0.05 SOLUTION/ DROPS OPHTHALMIC at 21:16

## 2025-04-16 RX ADMIN — AMLODIPINE BESYLATE 2.5 MILLIGRAM(S): 10 TABLET ORAL at 09:29

## 2025-04-16 RX ADMIN — Medication 100 GRAM(S): at 17:48

## 2025-04-16 RX ADMIN — Medication 1000 UNIT(S): at 09:30

## 2025-04-16 RX ADMIN — BRIMONIDINE TARTRATE 1 DROP(S): 1.5 SOLUTION/ DROPS OPHTHALMIC at 21:17

## 2025-04-16 RX ADMIN — Medication 81 MILLIGRAM(S): at 09:30

## 2025-04-16 RX ADMIN — BRIMONIDINE TARTRATE 1 DROP(S): 1.5 SOLUTION/ DROPS OPHTHALMIC at 09:30

## 2025-04-16 RX ADMIN — Medication 10 MILLIGRAM(S): at 17:08

## 2025-04-16 RX ADMIN — Medication 650 MILLIGRAM(S): at 10:06

## 2025-04-16 RX ADMIN — BUTALBITAL, ACETAMINOPHEN AND CAFFEINE 1 TABLET(S): 50; 325; 40 TABLET ORAL at 12:41

## 2025-04-16 RX ADMIN — DEXAMETHASONE 102 MILLIGRAM(S): 0.5 TABLET ORAL at 17:09

## 2025-04-16 RX ADMIN — ATORVASTATIN CALCIUM 80 MILLIGRAM(S): 80 TABLET, FILM COATED ORAL at 21:18

## 2025-04-16 NOTE — SWALLOW BEDSIDE ASSESSMENT ADULT - SWALLOW EVAL: DIAGNOSIS
1) Pt exhibits functional Oropharyngeal Swallowing integrity for age. No behavioral aspiration signs exhibited. Oropharyngeal swallow integrity is stable.

## 2025-04-16 NOTE — PHYSICAL THERAPY INITIAL EVALUATION ADULT - PLANNED THERAPY INTERVENTIONS, PT EVAL
Stair training. Eval, amb, transfers, bed mobility x 15'. Pt left supine in bed with all observation section equipment/material intact and bed alarm activated. Pt with no c/o pain. Will cont to follow./bed mobility training/gait training/transfer training

## 2025-04-16 NOTE — PROGRESS NOTE ADULT - ASSESSMENT
#Dizziness : BPPV vs VBI  - admit to medicine/tele  - CTA noted, incidental small saccular aneurysm noted. No acute cva or lvo  - ASA + statin  - TTE  - MRI  - Neuro checks q4h  - BGM q6h x 24 hours  - lipid/a1c  - Fall precautions  - meclizine prn  - gentle ivf  - DVT px: SCDs      Time spent: > 75 min spent during this encounter including  but not limited to chart review, labs/imaging, coordination of care and discussion with consultants.  D/W ED provider   MEDICATIONS  (STANDING):  amLODIPine   Tablet 2.5 milliGRAM(s) Oral daily  aspirin enteric coated 81 milliGRAM(s) Oral daily  atorvastatin 80 milliGRAM(s) Oral at bedtime  brimonidine 0.2% Ophthalmic Solution 1 Drop(s) Right EYE two times a day  cholecalciferol 1000 Unit(s) Oral daily  latanoprost 0.005% Ophthalmic Solution 1 Drop(s) Both EYES at bedtime  sodium chloride 0.9%. 1000 milliLiter(s) (75 mL/Hr) IV Continuous <Continuous>    MEDICATIONS  (PRN):  acetaminophen     Tablet .. 650 milliGRAM(s) Oral every 6 hours PRN Mild Pain (1 - 3)  meclizine 12.5 milliGRAM(s) Oral every 8 hours PRN Dizziness        #Dizziness and HA. Possible intractable migraine vs vertigo  -CVA ruled out  - admit to medicine/tele  - CTA noted, incidental small saccular aneurysm noted. No acute cva or lvo  - ASA + statin  - TTE grossly unremarkable  - MRI grossly unremarkable  - Neuro checks q4h  -   - Fall precautions  - meclizine prn  - gentle ivf  - DVT px: SCDs    #HTN  -Titrate Rx accordingly    4/16: MR negative. TTE unremarkable. Labs unremarkable. Still with significant HA and photophobia. Improvement in dizziness. Trial of fiorcet with mild improvement in symptoms. Only about 40% better than presentation as per patient. Discussed with neurology. Trial of IV mag/decadron/reglan. Reassess tomorrow and neuro recs for discharge planning tentatively tomorrow.     I spent a total of 53  minutes in face-to-face time with the patient and on the floor managing patient including coordination of care. Overall 50% of the time spent in discussion of the diagnosis, counseling and treatment plan.

## 2025-04-16 NOTE — PROGRESS NOTE ADULT - SUBJECTIVE AND OBJECTIVE BOX
Pt seen lying in bed, severe spinning sensation has resolved, mild dizziness + still feels uncomfortable     MRI brain reveals no acute infarct, chronic MVI changes are seen    BP - 101/59 - 146/86)    ROS: As above, other ROS negative    MEDICATIONS  (STANDING):  amLODIPine   Tablet 2.5 milliGRAM(s) Oral daily  aspirin enteric coated 81 milliGRAM(s) Oral daily  atorvastatin 80 milliGRAM(s) Oral at bedtime  brimonidine 0.2% Ophthalmic Solution 1 Drop(s) Right EYE two times a day  cholecalciferol 1000 Unit(s) Oral daily  latanoprost 0.005% Ophthalmic Solution 1 Drop(s) Both EYES at bedtime  sodium chloride 0.9%. 1000 milliLiter(s) (75 mL/Hr) IV Continuous <Continuous>      Vital Signs Last 24 Hrs  T(C): 37 (16 Apr 2025 16:31), Max: 37 (16 Apr 2025 16:31)  T(F): 98.6 (16 Apr 2025 16:31), Max: 98.6 (16 Apr 2025 16:31)  HR: 50 (16 Apr 2025 16:31) (50 - 57)  BP: 120/75 (16 Apr 2025 16:31) (101/59 - 146/86)  BP(mean): 88 (16 Apr 2025 08:07) (88 - 88)  RR: 18 (16 Apr 2025 16:31) (18 - 18)  SpO2: 95% (16 Apr 2025 16:31) (95% - 95%)    Parameters below as of 16 Apr 2025 16:31  Patient On (Oxygen Delivery Method): room air      Neurological exam:  HF: A x O x 3. Appropriately interactive, normal affect. Speech fluent, No Aphasia or paraphasic errors. Naming /repetition intact   CN: EMBER, EOMI, VFF, left beating nystagmus +saccade on HINTS, facial sensation normal, no NLFD, tongue midline, Palate moves equally  Motor: No pronator drift, Strength 5/5 in all 4 ext, normal bulk and tone, no tremor, rigidity or bradykinesia.    Sens: Intact to light touch  Reflexes: Symmetric and normal, downgoing toes b/l  Coord:  No FNFA, dysmetria, MICHAEL intact   Gait/Balance: Neg Romberg's, took a few steps, felt dizzy but no ataxia observed    NIHSS: 0                     15.4   7.35  )-----------( 181      ( 15 Apr 2025 10:56 )             45.1     04-16    143  |  115[H]  |  10  ----------------------------<  98  3.9   |  26  |  0.73    Ca    9.2      16 Apr 2025 07:48    TPro  7.5  /  Alb  3.5  /  TBili  0.6  /  DBili  x   /  AST  15  /  ALT  18  /  AlkPhos  87  04-15      04-16 Chol 172 LDL -- HDL 52 Trig 114    Radiology report:  -< from: MR Head No Cont (04.15.25 @ 22:50) >  IMPRESSION: No acute intracranial hemorrhage or evidence of acute   ischemia.    Multiple small rounded nonspecific abnormal white matter foci of T2/FLAIR   prolongation statistically favoring microvascular type changes.     from: CT Angio Neck Stroke Protocol w/ IV Cont (04.15.25 @ 11:10) >      IMPRESSION:    1.   RIGHT CAROTID SYSTEM:    No hemodynamically significant stenosis.  2.   LEFT CAROTID SYSTEM:     No hemodynamically significant stenosis.  3.   VERTEBRAL CIRCULATION:    Patent.  4.  ANTERIOR INTRACRANIAL CIRCULATION:     Right internal carotid tiny   saccular aneurysm (versus infundibulum of a branch vessel not otherwise identified).  5.  POSTERIOR INTRACRANIAL CIRCULATION:    Unremarkable.  6.  No large vessel occlusion.  7.  BRAIN PERFUSION:   No acute infarction of the brain is convincingly demonstrated.

## 2025-04-16 NOTE — SWALLOW BEDSIDE ASSESSMENT ADULT - SWALLOW EVAL: PROGNOSIS
2) Pt is alert and interactive. She is able to verbalize during communicative probes without evidence of a primary speech-language pathology. Pt is communicatively competent and reportedly at speech-language baseline.

## 2025-04-16 NOTE — PHYSICAL THERAPY INITIAL EVALUATION ADULT - PERTINENT HX OF CURRENT PROBLEM, REHAB EVAL
Pt admitted to  secondary to dizziness and unsteadiness. Code stroke was called in ED. CT head: neg. CTA head: neg. MRI head: neg. CTA head/neck: neg except for incidental finding DAVID tiny saccular aneurysm vs infundibulum.

## 2025-04-16 NOTE — SWALLOW BEDSIDE ASSESSMENT ADULT - COMMENTS
The pt was admitted to  with acute onset of photophobia, dizziness, gait unsteadiness, and lean to the left. A Code Stroke was called. CTH negative for acute stroke but the presence of a right ICA aneurysm was identified. Pt with clinical signs of vertigo. W/U in progress.

## 2025-04-16 NOTE — PROGRESS NOTE ADULT - SUBJECTIVE AND OBJECTIVE BOX
CHIEF COMPLAINT: HA/Dizziness    SUBJECTIVE/SIGNIFICANT INTERVAL EVENTS/OVERNIGHT EVENTS:    4/16: MR negative. TTE unremarkable. Labs unremarkable. Still with significant HA and photophobia. Improvement in dizziness. Trial of fiorcet with mild improvement in symptoms. Only about 40% better than presentation as per patient. Discussed with neurology. Trial of IV mag/decadron/reglan. Reassess tomorrow and neuro recs for discharge planning tentatively tomorrow.     Review of Systems: 14 Point review of systems reviewed and reported as negative unless otherwise stated above    FROM H&P:  ""    PHYSICAL EXAM:    T(C): 36.8 (04-16-25 @ 20:48), Max: 37 (04-16-25 @ 16:31)  HR: 59 (04-16-25 @ 20:48) (50 - 59)  BP: 123/72 (04-16-25 @ 20:48) (101/59 - 146/86)  RR: 18 (04-16-25 @ 20:48) (18 - 18)  SpO2: 91% (04-16-25 @ 20:48) (91% - 95%)    General: AAOx3; NAD  Head: AT/NC  ENT: Moist Mucous Membranes; No Injury  Eyes: EOMI; PERRL  Neck: Non-tender; No JVD  CVS: RRR, S1&S2, No murmur, No edema  Respiratory: Lungs CTA B/L; Normal Respiratory Effort  Abdomen/GI: Soft, non-tender, non-distended, no guarding, no rebound, normal bowel sounds  : No bladder distention, No Shah  Extremities: No cyanosis, No clubbing, No edema  MSK: No CVA tenderness, Normal ROM, No injury  Neuro: AAOx3, CNII-XII grossly intact, non-focal  Psych: Appropriate, Cooperative, No depression, No anxiety  Skin: Clean, Dry and Intact      LABS:                          15.4   7.35  )-----------( 181      ( 15 Apr 2025 10:56 )             45.1     04-16    143  |  115[H]  |  10  ----------------------------<  98  3.9   |  26  |  0.73    Ca    9.2      16 Apr 2025 07:48    TPro  7.5  /  Alb  3.5  /  TBili  0.6  /  DBili  x   /  AST  15  /  ALT  18  /  AlkPhos  87  04-15      CAPILLARY BLOOD GLUCOSE          Urinalysis with Rflx Culture (collected 15 Apr 2025 11:30)          RADIOLOGY:      EKG:      ECHO:      PROCEDURES:        I personally reviewed labs, imaging, ekg, orders and vitals.    Discussed case with:  []RN  []CM/SW  []Patient  []Family  []Specialist:        MEDICATIONS  (STANDING):  amLODIPine   Tablet 2.5 milliGRAM(s) Oral daily  aspirin enteric coated 81 milliGRAM(s) Oral daily  atorvastatin 80 milliGRAM(s) Oral at bedtime  brimonidine 0.2% Ophthalmic Solution 1 Drop(s) Right EYE two times a day  cholecalciferol 1000 Unit(s) Oral daily  latanoprost 0.005% Ophthalmic Solution 1 Drop(s) Both EYES at bedtime  sodium chloride 0.9%. 1000 milliLiter(s) (75 mL/Hr) IV Continuous <Continuous>    MEDICATIONS  (PRN):  acetaminophen     Tablet .. 650 milliGRAM(s) Oral every 6 hours PRN Mild Pain (1 - 3)  meclizine 12.5 milliGRAM(s) Oral every 8 hours PRN Dizziness     CHIEF COMPLAINT: HA/Dizziness    SUBJECTIVE/SIGNIFICANT INTERVAL EVENTS/OVERNIGHT EVENTS:    4/16: MR negative. TTE unremarkable. Labs unremarkable. Still with significant HA and photophobia. Improvement in dizziness. Trial of fiorcet with mild improvement in symptoms. Only about 40% better than presentation as per patient. Discussed with neurology. Trial of IV mag/decadron/reglan. Reassess tomorrow and neuro recs for discharge planning tentatively tomorrow.     Review of Systems: 14 Point review of systems reviewed and reported as negative unless otherwise stated above    FROM H&P:  "65 y/o female with PMHx of HTN, glaucoma presents to the ED with c/o dizziness. Pt states she woke up at 6:30 fine, went to work and at 8 am the room started spinning, felt nauseous, felt unsteady and was leaning more to the L, had blurred vision and some photophobia.  Denies focal weakness, numbness, dysarthria, facial droop, diplopia, tinnitus, ear pain, recent travel, recent illness. Has increased stress as she takes care of her ill brother.  Code stroke was called in the ED. NIHSS 0.  CT imaging was neg for acute stroke/bleed, CTA no LVO, CTP no perfusion mismatch.  Incidental DAVID tiny saccular aneurysm vs infundibulum. The patient was not a candidate for IV thrombolytics because of no measurable deficits, and not a thrombectomy candidate because no LVO.  "    Pt seen and examined with family at bedside - states her dizziness began at 0800 today. Feels like the room is spinning. Worse with movt but still present when she is laying down  No neurological deficits. No facial droop, no dysarthria or aphasia. No blurry vision. No recent uri. No head trauma. No neck pain.   Neuro exam completely non focal in ED    EKG: SB, no stt changes"    PHYSICAL EXAM:    T(C): 36.8 (04-16-25 @ 20:48), Max: 37 (04-16-25 @ 16:31)  HR: 59 (04-16-25 @ 20:48) (50 - 59)  BP: 123/72 (04-16-25 @ 20:48) (101/59 - 146/86)  RR: 18 (04-16-25 @ 20:48) (18 - 18)  SpO2: 91% (04-16-25 @ 20:48) (91% - 95%)    General: AAOx3; NAD  Head: AT/NC  ENT: Moist Mucous Membranes; No Injury  Eyes: EOMI; PERRL  Neck: Non-tender; No JVD  CVS: RRR, S1&S2, No murmur, No edema  Respiratory: Lungs CTA B/L; Normal Respiratory Effort  Abdomen/GI: Soft, non-tender, non-distended, no guarding, no rebound, normal bowel sounds  : No bladder distention, No Shah  Extremities: No cyanosis, No clubbing, No edema  MSK: No CVA tenderness, Normal ROM, No injury  Neuro: AAOx3, CNII-XII grossly intact, non-focal  Psych: Appropriate, Cooperative,   Skin: Clean, Dry and Intact      LABS:                          15.4   7.35  )-----------( 181      ( 15 Apr 2025 10:56 )             45.1     04-16    143  |  115[H]  |  10  ----------------------------<  98  3.9   |  26  |  0.73    Ca    9.2      16 Apr 2025 07:48    TPro  7.5  /  Alb  3.5  /  TBili  0.6  /  DBili  x   /  AST  15  /  ALT  18  /  AlkPhos  87  04-15      CAPILLARY BLOOD GLUCOSE    LDL Cholesterol Calculated: 100: FluA/FluB/RSV/COVID PCR (04.15.25 @ 12:48)   [facility]: This molecular assay uses polymerase chain reaction (PCR) to detect   influenza A virus, influenza B virus, respiratory syncytial virus (RSV),   and SARS-CoV-2 (COVID-19). Test results should be correlated with   clinical presentation, patient history, and epidemiology.  Influenza A Result: [facility]  Influenza B [facility]  Resp Syn Virus Result: NotDete  Source Respiratory: NasopharyngealAlcohol, Blood (04.15.25 @ 11:56)   Alcohol, Blood: <10Urinalysis with Rflx Culture (04.15.25 @ 11:30)   Urine Appearance: Clear  Color: Yellow  Specific Gravity: 1.018  pH Urine: 7.0  Protein, Urine: Negative mg/dL  Glucose Qualitative, Urine: Negative mg/dL  Ketone - Urine: Negative mg/dL  Blood, Urine: Negative  Bilirubin: Negative  Urobilinogen: 0.2 mg/dL  Leukocyte Esterase Concentration: Negative  Nitrite: NegativeTroponin I, High Sensitivity (04.15.25 @ 10:56)   Troponin I, High Sensitivity Result: 3.68:      Urinalysis with Rflx Culture (collected 15 Apr 2025 11:30)      RADIOLOGY:  < from: MR Head No Cont (04.15.25 @ 22:50) >    IMPRESSION: No acute intracranial hemorrhage or evidence of acute   ischemia.    Multiple small rounded nonspecific abnormal white matter foci of T2/FLAIR   prolongation statistically favoring microvascular type changes.    < end of copied text >  < from: Xray Chest 1 View- PORTABLE-Urgent (04.15.25 @ 12:20) >    IMPRESSION: Cervical spine hardware at this time. Otherwise unremarkable   study.    < end of copied text >  < from: CT Brain Perfusion Maps Stroke (04.15.25 @ 11:09) >    IMPRESSION:    1.   RIGHT CAROTID SYSTEM:    No hemodynamically significant stenosis.    2.   LEFT CAROTID SYSTEM:     No hemodynamically significant stenosis.    3.   VERTEBRAL CIRCULATION:    Patent.    4.  ANTERIOR INTRACRANIAL CIRCULATION:     Right internal carotid tiny   saccular aneurysm (versus infundibulum of a branch vessel not otherwise   identified).    5.  POSTERIOR INTRACRANIAL CIRCULATION:    Unremarkable.    6.  No large vessel occlusion.    7.  BRAIN PERFUSION:   No acute infarction of the brain is convincingly   demonstrated.    < end of copied text >      EKG:      ECHO:      PROCEDURES:        I personally reviewed labs, imaging, ekg, orders and vitals.    Discussed case with:  []RN  []CM/SW  []Patient  []Family  []Specialist:        MEDICATIONS  (STANDING):  amLODIPine   Tablet 2.5 milliGRAM(s) Oral daily  aspirin enteric coated 81 milliGRAM(s) Oral daily  atorvastatin 80 milliGRAM(s) Oral at bedtime  brimonidine 0.2% Ophthalmic Solution 1 Drop(s) Right EYE two times a day  cholecalciferol 1000 Unit(s) Oral daily  latanoprost 0.005% Ophthalmic Solution 1 Drop(s) Both EYES at bedtime  sodium chloride 0.9%. 1000 milliLiter(s) (75 mL/Hr) IV Continuous <Continuous>    MEDICATIONS  (PRN):  acetaminophen     Tablet .. 650 milliGRAM(s) Oral every 6 hours PRN Mild Pain (1 - 3)  meclizine 12.5 milliGRAM(s) Oral every 8 hours PRN Dizziness     CHIEF COMPLAINT: HA/Dizziness    SUBJECTIVE/SIGNIFICANT INTERVAL EVENTS/OVERNIGHT EVENTS:    4/16: MR negative. TTE unremarkable. Labs unremarkable. Still with significant HA and photophobia. Improvement in dizziness. Trial of fiorcet with mild improvement in symptoms. Only about 40% better than presentation as per patient. Discussed with neurology. Trial of IV mag/decadron/reglan. Reassess tomorrow and neuro recs for discharge planning tentatively tomorrow.     Review of Systems: 14 Point review of systems reviewed and reported as negative unless otherwise stated above    FROM H&P:  "65 y/o female with PMHx of HTN, glaucoma presents to the ED with c/o dizziness. Pt states she woke up at 6:30 fine, went to work and at 8 am the room started spinning, felt nauseous, felt unsteady and was leaning more to the L, had blurred vision and some photophobia.  Denies focal weakness, numbness, dysarthria, facial droop, diplopia, tinnitus, ear pain, recent travel, recent illness. Has increased stress as she takes care of her ill brother.  Code stroke was called in the ED. NIHSS 0.  CT imaging was neg for acute stroke/bleed, CTA no LVO, CTP no perfusion mismatch.  Incidental DAVID tiny saccular aneurysm vs infundibulum. The patient was not a candidate for IV thrombolytics because of no measurable deficits, and not a thrombectomy candidate because no LVO.  "    Pt seen and examined with family at bedside - states her dizziness began at 0800 today. Feels like the room is spinning. Worse with movt but still present when she is laying down  No neurological deficits. No facial droop, no dysarthria or aphasia. No blurry vision. No recent uri. No head trauma. No neck pain.   Neuro exam completely non focal in ED    EKG: SB, no stt changes"    PHYSICAL EXAM:    T(C): 36.8 (04-16-25 @ 20:48), Max: 37 (04-16-25 @ 16:31)  HR: 59 (04-16-25 @ 20:48) (50 - 59)  BP: 123/72 (04-16-25 @ 20:48) (101/59 - 146/86)  RR: 18 (04-16-25 @ 20:48) (18 - 18)  SpO2: 91% (04-16-25 @ 20:48) (91% - 95%)    General: AAOx3; NAD  Head: AT/NC  ENT: Moist Mucous Membranes; No Injury  Eyes: EOMI; PERRL  Neck: Non-tender; No JVD  CVS: RRR, S1&S2, No murmur, No edema  Respiratory: Lungs CTA B/L; Normal Respiratory Effort  Abdomen/GI: Soft, non-tender, non-distended, no guarding, no rebound, normal bowel sounds  : No bladder distention, No Shah  Extremities: No cyanosis, No clubbing, No edema  MSK: No CVA tenderness, Normal ROM, No injury  Neuro: AAOx3, CNII-XII grossly intact, non-focal  Psych: Appropriate, Cooperative,   Skin: Clean, Dry and Intact      LABS:                          15.4   7.35  )-----------( 181      ( 15 Apr 2025 10:56 )             45.1     04-16    143  |  115[H]  |  10  ----------------------------<  98  3.9   |  26  |  0.73    Ca    9.2      16 Apr 2025 07:48    TPro  7.5  /  Alb  3.5  /  TBili  0.6  /  DBili  x   /  AST  15  /  ALT  18  /  AlkPhos  87  04-15      CAPILLARY BLOOD GLUCOSE    LDL Cholesterol Calculated: 100: FluA/FluB/RSV/COVID PCR (04.15.25 @ 12:48)   [facility]: This molecular assay uses polymerase chain reaction (PCR) to detect   influenza A virus, influenza B virus, respiratory syncytial virus (RSV),   and SARS-CoV-2 (COVID-19). Test results should be correlated with   clinical presentation, patient history, and epidemiology.  Influenza A Result: [facility]  [facility] B [facility]  Resp Syn Virus Result: NotDete  Source Respiratory: NasopharyngealAlcohol, Blood (04.15.25 @ 11:56)   Alcohol, Blood: <10Urinalysis with Rflx Culture (04.15.25 @ 11:30)   Urine Appearance: Clear  Color: Yellow  Specific Gravity: 1.018  pH Urine: 7.0  Protein, Urine: Negative mg/dL  Glucose Qualitative, Urine: Negative mg/dL  Ketone - Urine: Negative mg/dL  Blood, Urine: Negative  Bilirubin: Negative  Urobilinogen: 0.2 mg/dL  Leukocyte Esterase Concentration: Negative  Nitrite: NegativeTroponin I, High Sensitivity (04.15.25 @ 10:56)   Troponin I, High Sensitivity Result: 3.68:      Urinalysis with Rflx Culture (collected 15 Apr 2025 11:30)      RADIOLOGY:  < from: MR Head No Cont (04.15.25 @ 22:50) >    IMPRESSION: No acute intracranial hemorrhage or evidence of acute   ischemia.    Multiple small rounded nonspecific abnormal white matter foci of T2/FLAIR   prolongation statistically favoring microvascular type changes.    < end of copied text >  < from: Xray Chest 1 View- PORTABLE-Urgent (04.15.25 @ 12:20) >    IMPRESSION: Cervical spine hardware at this time. Otherwise unremarkable   study.    < end of copied text >  < from: CT Brain Perfusion Maps Stroke (04.15.25 @ 11:09) >    IMPRESSION:    1.   RIGHT CAROTID SYSTEM:    No hemodynamically significant stenosis.    2.   LEFT CAROTID SYSTEM:     No hemodynamically significant stenosis.    3.   VERTEBRAL CIRCULATION:    Patent.    4.  ANTERIOR INTRACRANIAL CIRCULATION:     Right internal carotid tiny   saccular aneurysm (versus infundibulum of a branch vessel not otherwise   identified).    5.  POSTERIOR INTRACRANIAL CIRCULATION:    Unremarkable.    6.  No large vessel occlusion.    7.  BRAIN PERFUSION:   No acute infarction of the brain is convincingly   demonstrated.    < end of copied text >      EKG:  < from: 12 Lead ECG (04.15.25 @ 11:16) >  Diagnosis Line Sinus bradycardia with Premature atrial complexes in a pattern of bigeminy  Otherwise normal ECG  When compared with ECG of 08-FEB-2011 11:03,  Premature atrial complexes are now Present    < end of copied text >      ECHO:  < from: TTE W or WO Ultrasound Enhancing Agent (04.16.25 @ 08:28) >  CONCLUSIONS:      1.Left ventricular cavity is normal in size. Left ventricular systolic function is normal with an ejection fraction visually estimated at 60 to 65 %.   2. Mild left ventricular hypertrophy.   3. There is mild (grade 1) left ventricular diastolic dysfunction.   4. Normal right ventricular cavity size and normal right ventricular systolic function.   5. Mild tricuspid regurgitation.   6. Estimated pulmonary artery systolic pressure is 25 mmHg.   7. Mild pulmonic regurgitation.   8. No pericardial effusion seen.    < end of copied text >            I personally reviewed labs, imaging, ekg, orders and vitals.    Discussed case with:  [X]RN  [X]MULUGETA/ABBY  [X]Patient  [X]Family  [X]Specialist: Neurology

## 2025-04-16 NOTE — PROGRESS NOTE ADULT - ASSESSMENT
65 y/o female with PMHx of HTN, glaucoma presents to the ED with c/o dizziness. Pt woke up at 6:30 fine, went to work, at 8 am the room started spinning, felt nauseous, unsteady, was leaning  to the L, had blurred vision and some photophobia.  Code stroke was called in the ED. NIHSS 0.  CT head neg for acute stroke/bleed, CTA no LVO, CTP no perfusion mismatch.  Incidental DAVID tiny saccular aneurysm vs infundibulum. The patient was not a candidate for IV thrombolytics because of no measurable deficits, and not a thrombectomy candidate because no LVO.   PE no focal deficits, L beating nystagmus, +saccade on HINTS.    #Likely peripheral vertigo/vertiginous migraine; no evidence of acute infarct    # HTN- optimal      Recommendations  -PT eval  -May give trial with meclizine / prednisone if sx persist    D/W Dr. Gibson Alert-The patient is alert, awake and responds to voice. The patient is oriented to time, place, and person. The triage nurse is able to obtain subjective information.

## 2025-04-16 NOTE — SWALLOW BEDSIDE ASSESSMENT ADULT - SLP GENERAL OBSERVATIONS
Pt is alert and interactive. She is able to verbalize during communicative probes without evidence of a primary speech-language pathology. Pt is communicatively competent and reportedly at speech-language baseline.

## 2025-04-16 NOTE — SWALLOW BEDSIDE ASSESSMENT ADULT - SWALLOW EVAL: CRITERIA FOR SKILLED INTERVENTION MET
NO NEED FOR ACUTE SPEECH PATHOLOGY INTERVENTION. PT'S SPEECH-LANGUAGE AND OROPHARYNGEAL SWALLOWING INTEGRITY ARE FUNCTIONAL/AT BASELINE. NO NEED FOR ST. GIVEN ABOVE, THIS SERVICE WILL NOT ACTIVELY FOLLOW. RECONSULT PRN SHOULD STATUS CHANGE AND CONDITION WARRANT.

## 2025-04-16 NOTE — SWALLOW BEDSIDE ASSESSMENT ADULT - NS SPL SWALLOW CLINIC TRIAL FT
Pt exhibited functional Oropharyngeal Swallowing integrity for age. No behavioral aspiration signs exhibited. Oropharyngeal swallow integrity is stable.

## 2025-04-17 ENCOUNTER — TRANSCRIPTION ENCOUNTER (OUTPATIENT)
Age: 65
End: 2025-04-17

## 2025-04-17 VITALS
SYSTOLIC BLOOD PRESSURE: 131 MMHG | DIASTOLIC BLOOD PRESSURE: 72 MMHG | RESPIRATION RATE: 18 BRPM | TEMPERATURE: 98 F | HEART RATE: 59 BPM | OXYGEN SATURATION: 98 %

## 2025-04-17 LAB
ANION GAP SERPL CALC-SCNC: 6 MMOL/L — SIGNIFICANT CHANGE UP (ref 5–17)
BUN SERPL-MCNC: 12 MG/DL — SIGNIFICANT CHANGE UP (ref 7–23)
CALCIUM SERPL-MCNC: 9.7 MG/DL — SIGNIFICANT CHANGE UP (ref 8.5–10.1)
CHLORIDE SERPL-SCNC: 112 MMOL/L — HIGH (ref 96–108)
CO2 SERPL-SCNC: 23 MMOL/L — SIGNIFICANT CHANGE UP (ref 22–31)
CREAT SERPL-MCNC: 0.69 MG/DL — SIGNIFICANT CHANGE UP (ref 0.5–1.3)
EGFR: 96 ML/MIN/1.73M2 — SIGNIFICANT CHANGE UP
EGFR: 96 ML/MIN/1.73M2 — SIGNIFICANT CHANGE UP
ERYTHROCYTE [SEDIMENTATION RATE] IN BLOOD: 8 MM/HR — SIGNIFICANT CHANGE UP (ref 0–20)
GLUCOSE SERPL-MCNC: 134 MG/DL — HIGH (ref 70–99)
HCT VFR BLD CALC: 43.6 % — SIGNIFICANT CHANGE UP (ref 34.5–45)
HGB BLD-MCNC: 15.3 G/DL — SIGNIFICANT CHANGE UP (ref 11.5–15.5)
MAGNESIUM SERPL-MCNC: 2.2 MG/DL — SIGNIFICANT CHANGE UP (ref 1.6–2.6)
MCHC RBC-ENTMCNC: 29.3 PG — SIGNIFICANT CHANGE UP (ref 27–34)
MCHC RBC-ENTMCNC: 35.1 G/DL — SIGNIFICANT CHANGE UP (ref 32–36)
MCV RBC AUTO: 83.5 FL — SIGNIFICANT CHANGE UP (ref 80–100)
NRBC # BLD AUTO: 0 K/UL — SIGNIFICANT CHANGE UP (ref 0–0)
NRBC # FLD: 0 K/UL — SIGNIFICANT CHANGE UP (ref 0–0)
NRBC BLD AUTO-RTO: 0 /100 WBCS — SIGNIFICANT CHANGE UP (ref 0–0)
PHOSPHATE SERPL-MCNC: 2.2 MG/DL — LOW (ref 2.5–4.5)
PLATELET # BLD AUTO: 178 K/UL — SIGNIFICANT CHANGE UP (ref 150–400)
PMV BLD: 12.8 FL — SIGNIFICANT CHANGE UP (ref 7–13)
POTASSIUM SERPL-MCNC: 4.2 MMOL/L — SIGNIFICANT CHANGE UP (ref 3.5–5.3)
POTASSIUM SERPL-SCNC: 4.2 MMOL/L — SIGNIFICANT CHANGE UP (ref 3.5–5.3)
RBC # BLD: 5.22 M/UL — HIGH (ref 3.8–5.2)
RBC # FLD: 13.2 % — SIGNIFICANT CHANGE UP (ref 10.3–14.5)
SODIUM SERPL-SCNC: 141 MMOL/L — SIGNIFICANT CHANGE UP (ref 135–145)
WBC # BLD: 5.64 K/UL — SIGNIFICANT CHANGE UP (ref 3.8–10.5)
WBC # FLD AUTO: 5.64 K/UL — SIGNIFICANT CHANGE UP (ref 3.8–10.5)

## 2025-04-17 PROCEDURE — 99239 HOSP IP/OBS DSCHRG MGMT >30: CPT

## 2025-04-17 RX ORDER — MECLIZINE HCL 12.5 MG
1 TABLET ORAL
Qty: 90 | Refills: 0
Start: 2025-04-17 | End: 2025-05-16

## 2025-04-17 RX ORDER — BUTALBITAL, ACETAMINOPHEN AND CAFFEINE 50; 325; 40 MG/1; MG/1; MG/1
1 TABLET ORAL
Qty: 40 | Refills: 0
Start: 2025-04-17 | End: 2025-04-26

## 2025-04-17 RX ORDER — SOD PHOS DI, MONO/K PHOS MONO 250 MG
1 TABLET ORAL ONCE
Refills: 0 | Status: COMPLETED | OUTPATIENT
Start: 2025-04-17 | End: 2025-04-17

## 2025-04-17 RX ORDER — METOCLOPRAMIDE HCL 10 MG
1 TABLET ORAL
Qty: 21 | Refills: 0
Start: 2025-04-17 | End: 2025-04-23

## 2025-04-17 RX ORDER — BUTALBITAL AND ACETAMINOPHEN 325; 50 MG/1; MG/1
1 TABLET ORAL
Qty: 40 | Refills: 0
Start: 2025-04-17 | End: 2025-04-26

## 2025-04-17 RX ORDER — BUTALBITAL, ACETAMINOPHEN AND CAFFEINE 50; 325; 40 MG/1; MG/1; MG/1
1 TABLET ORAL
Qty: 30 | Refills: 0
Start: 2025-04-17 | End: 2025-04-26

## 2025-04-17 RX ADMIN — Medication 81 MILLIGRAM(S): at 09:24

## 2025-04-17 RX ADMIN — Medication 1 PACKET(S): at 10:43

## 2025-04-17 RX ADMIN — BRIMONIDINE TARTRATE 1 DROP(S): 1.5 SOLUTION/ DROPS OPHTHALMIC at 09:25

## 2025-04-17 RX ADMIN — Medication 1000 UNIT(S): at 09:24

## 2025-04-17 RX ADMIN — AMLODIPINE BESYLATE 2.5 MILLIGRAM(S): 10 TABLET ORAL at 09:24

## 2025-04-17 NOTE — DISCHARGE NOTE PROVIDER - CARE PROVIDERS DIRECT ADDRESSES
,[email or web address]

## 2025-04-17 NOTE — DISCHARGE NOTE NURSING/CASE MANAGEMENT/SOCIAL WORK - FINANCIAL ASSISTANCE
[facility] provides services at a reduced cost to those who are determined to be eligible through [facility]’s financial assistance program. Information regarding [facility]’s financial assistance program can be found by going to [email or web address] or by calling [phone].

## 2025-04-17 NOTE — DISCHARGE NOTE PROVIDER - NSDCCPCAREPLAN_GEN_ALL_CORE_FT
PRINCIPAL DISCHARGE DIAGNOSIS  Diagnosis: Dizziness  Assessment and Plan of Treatment: Underwent evaluation for stroke and imaging and presentation not suggestive of stroke. Could have a component of vertigo. Take meclizine as needed for dizziness. Could also have underlying headache/migraine disorder. The diagnosis (specific type) and treatment is longitudinal process in the outpatient setting. Take Fiorcet as needed for migraines and if symptoms don't resolve with fioricet, take reglan (metoclopramide) as needed. Maintain a log of headaches including date and time of onset, duration of symptoms, alleviating factors (change in position, medication, etc) and aggravating factor (change in position, light insensitivity, certain foods, etc), location of headache (front, side, back, left vs right vs bilateral), quality of headache (sharp, dull, aching, stabbing, burning, etc) and intensity (scale of 1-10 with 10 being the worst pain). This will help distinguish the specific type of headache and best possible treatment plan in the outpatient setting.

## 2025-04-17 NOTE — DISCHARGE NOTE NURSING/CASE MANAGEMENT/SOCIAL WORK - NSDCPEFALRISK_GEN_ALL_CORE
For information on Fall & Injury Prevention, visit: [email or web address] OR  [email or web address] OR  https://www.cdc.gov/steadi/patient.html

## 2025-04-17 NOTE — DISCHARGE NOTE PROVIDER - PROVIDER TOKENS
PROVIDER:[TOKEN:[[id number]],FOLLOWUP:[1 week],ESTABLISHEDPATIENT:[T]],FREE:[LAST:[Eye Doctor],PHONE:[(   )    -],FAX:[(   )    -],FOLLOWUP:[1 week]],PROVIDER:[TOKEN:[[id number]],FOLLOWUP:[Routine],ESTABLISHEDPATIENT:[T]]

## 2025-04-17 NOTE — DISCHARGE NOTE PROVIDER - HOSPITAL COURSE
FROM H&P:    "63 y/o female with PMHx of HTN, glaucoma presents to the ED with c/o dizziness. Pt states she woke up at 6:30 fine, went to work and at 8 am the room started spinning, felt nauseous, felt unsteady and was leaning more to the L, had blurred vision and some photophobia.  Denies focal weakness, numbness, dysarthria, facial droop, diplopia, tinnitus, ear pain, recent travel, recent illness. Has increased stress as she takes care of her ill brother.  Code stroke was called in the ED. NIHSS 0.  CT imaging was neg for acute stroke/bleed, CTA no LVO, CTP no perfusion mismatch.  Incidental DAVID tiny saccular aneurysm vs infundibulum. The patient was not a candidate for IV thrombolytics because of no measurable deficits, and not a thrombectomy candidate because no LVO.  "    Pt seen and examined with family at bedside - states her dizziness began at 0800 today. Feels like the room is spinning. Worse with movt but still present when she is laying down  No neurological deficits. No facial droop, no dysarthria or aphasia. No blurry vision. No recent uri. No head trauma. No neck pain.   Neuro exam completely non focal in ED    EKG: SB, no stt changes"    4/16: MR negative. TTE unremarkable. Labs unremarkable. Still with significant HA and photophobia. Improvement in dizziness. Trial of fiorcet with mild improvement in symptoms. Only about 40% better than presentation as per patient. Discussed with neurology. Trial of IV mag/decadron/reglan. Reassess tomorrow and neuro recs for discharge planning tentatively tomorrow.     4/17: Marked imrpovement in symptoms and near resolution. Likely has migraines headache component. Discharge home in stable condition and close outpatient follow up with primary and possible outpatient referral to neurology for longitudinal follow up on migrain/headache    #Dizziness and HA. Possible intractable migraine +/- vertigo  -CVA ruled out  - admit to medicine/tele  - CTA noted, incidental small saccular aneurysm noted. No acute cva or lvo  - ASA + statin-> CVA ruled out. Less likely TIA. Discontinue Aspirin/Statin  - TTE grossly unremarkable  - MRI grossly unremarkable  - Neuro checks q4h  -   - Fall precautions  - meclizine prn  - gentle ivf  - DVT px: SCDs    #HTN  -Titrate Rx accordingly  T(C): 36.4 (04-17-25 @ 08:56), Max: 37 (04-16-25 @ 16:31)  HR: 55 (04-17-25 @ 08:56) (50 - 78)  BP: 145/64 (04-17-25 @ 08:56) (101/59 - 145/64)  RR: 18 (04-17-25 @ 08:56) (18 - 18)  SpO2: 98% (04-17-25 @ 08:56) (91% - 98%)  General: AAOx3; NAD  Head: AT/NC  ENT: Moist Mucous Membranes; No Injury  Eyes: EOMI; PERRL  Neck: Non-tender; No JVD  CVS: RRR, S1&S2, No murmur, No edema  Respiratory: Lungs CTA B/L; Normal Respiratory Effort  Abdomen/GI: Soft, non-tender, non-distended, no guarding, no rebound, normal bowel sounds  : No bladder distention, No Shah  Extremities: No cyanosis, No clubbing, No edema  MSK: No CVA tenderness, Normal ROM, No injury  Neuro: AAOx3, CNII-XII grossly intact, non-focal  Psych: Appropriate, Cooperative,   Discharge Management: 37 minutes  Date of Discharge/Service: 4/17/2025

## 2025-04-17 NOTE — DISCHARGE NOTE PROVIDER - NSDCCAREPROVSEEN_GEN_ALL_CORE_FT
Herlinda, Luis Miguel Pickett, Unique Matthews, Callie Camp, Mariella Ravi, Tamiko Leonard, David Gibson, Ashvin

## 2025-04-17 NOTE — DISCHARGE NOTE NURSING/CASE MANAGEMENT/SOCIAL WORK - PATIENT PORTAL LINK FT
You can access the FollowMyHealth Patient Portal offered by [facility] by registering at the following website: [email or web address]. By joining [employer]’s FollowMyHealth portal, you will also be able to view your health information using other applications (apps) compatible with our system.

## 2025-04-17 NOTE — DISCHARGE NOTE PROVIDER - CARE PROVIDER_API CALL
Drew Corley  Family Medicine  [address] [place] [address]  Phone: [phone]  Fax: [phone]  Established Patient  Follow Up Time: 1 week    Eye Doctor,   Phone: (   )    -  Fax: (   )    -  Follow Up Time: 1 week    Mariella Camp  Neurology  [address] [facility], [address] [place] [address]  Phone: [phone]  Fax: [phone]  Established Patient  Follow Up Time: Routine

## 2025-04-17 NOTE — CHART NOTE - NSCHARTNOTEFT_GEN_A_CORE
Date: 2025    Re: Gris Small ( 1960)    To whom it may concern:    Gris Small admitted to [facility] 4/15/2025 through 2025. Discharge in medically stable condition. Medically cleared to return to work/school without restrictions on 2025. Kindly excuse her during this period of time.      Sincerely    Ashvin Gibson MD  Hospitalist/Attending Physician  [facility]

## 2025-04-17 NOTE — DISCHARGE NOTE PROVIDER - NSDCMRMEDTOKEN_GEN_ALL_CORE_FT
amLODIPine 2.5 mg oral tablet: 1 tab(s) orally once a day  brimonidine 0.2% ophthalmic solution: 1 drop(s) in the right eye 2 times a day  butalbital/acetaminophen/caffeine 50 mg-325 mg-40 mg oral tablet: 1 tab(s) orally every 8 hours as needed for  migraines  cholecalciferol 25 mcg (1000 intl units) oral tablet: 1 tab(s) orally once a day  latanoprost 0.005% ophthalmic solution: 1 drop(s) in each eye once a day (at bedtime)  meclizine 25 mg oral tablet: 1 tab(s) orally every 8 hours as needed for  dizziness  Reglan 5 mg oral tablet: 1 tab(s) orally every 8 hours as needed for  migraine refractory to fioricet

## 2025-04-20 ENCOUNTER — EMERGENCY (EMERGENCY)
Facility: HOSPITAL | Age: 65
LOS: 0 days | Discharge: HOME | End: 2025-04-20
Attending: EMERGENCY MEDICINE
Payer: MEDICARE

## 2025-04-20 VITALS
DIASTOLIC BLOOD PRESSURE: 76 MMHG | TEMPERATURE: 98 F | RESPIRATION RATE: 18 BRPM | HEART RATE: 61 BPM | SYSTOLIC BLOOD PRESSURE: 152 MMHG | OXYGEN SATURATION: 94 %

## 2025-04-20 VITALS — HEIGHT: 64 IN | WEIGHT: 169.98 LBS

## 2025-04-20 DIAGNOSIS — H40.9 UNSPECIFIED GLAUCOMA: ICD-10-CM

## 2025-04-20 DIAGNOSIS — I82.402 ACUTE EMBOLISM AND THROMBOSIS OF UNSPECIFIED DEEP VEINS OF LEFT LOWER EXTREMITY: ICD-10-CM

## 2025-04-20 DIAGNOSIS — Z88.0 ALLERGY STATUS TO PENICILLIN: ICD-10-CM

## 2025-04-20 DIAGNOSIS — Z91.040 LATEX ALLERGY STATUS: ICD-10-CM

## 2025-04-20 DIAGNOSIS — M79.662 PAIN IN LEFT LOWER LEG: ICD-10-CM

## 2025-04-20 DIAGNOSIS — I10 ESSENTIAL (PRIMARY) HYPERTENSION: ICD-10-CM

## 2025-04-20 LAB
ALBUMIN SERPL ELPH-MCNC: 3.3 G/DL — SIGNIFICANT CHANGE UP (ref 3.3–5)
ALP SERPL-CCNC: 91 U/L — SIGNIFICANT CHANGE UP (ref 40–120)
ALT FLD-CCNC: 21 U/L — SIGNIFICANT CHANGE UP (ref 12–78)
ANION GAP SERPL CALC-SCNC: 3 MMOL/L — LOW (ref 5–17)
APTT BLD: 27 SEC — SIGNIFICANT CHANGE UP (ref 24.5–35.6)
AST SERPL-CCNC: 12 U/L — LOW (ref 15–37)
BASOPHILS # BLD AUTO: 0.02 K/UL — SIGNIFICANT CHANGE UP (ref 0–0.2)
BASOPHILS NFR BLD AUTO: 0.3 % — SIGNIFICANT CHANGE UP (ref 0–2)
BILIRUB SERPL-MCNC: 0.8 MG/DL — SIGNIFICANT CHANGE UP (ref 0.2–1.2)
BUN SERPL-MCNC: 12 MG/DL — SIGNIFICANT CHANGE UP (ref 7–23)
CALCIUM SERPL-MCNC: 9.7 MG/DL — SIGNIFICANT CHANGE UP (ref 8.5–10.1)
CHLORIDE SERPL-SCNC: 110 MMOL/L — HIGH (ref 96–108)
CO2 SERPL-SCNC: 26 MMOL/L — SIGNIFICANT CHANGE UP (ref 22–31)
CREAT SERPL-MCNC: 0.78 MG/DL — SIGNIFICANT CHANGE UP (ref 0.5–1.3)
EGFR: 84 ML/MIN/1.73M2 — SIGNIFICANT CHANGE UP
EGFR: 84 ML/MIN/1.73M2 — SIGNIFICANT CHANGE UP
EOSINOPHIL # BLD AUTO: 0.1 K/UL — SIGNIFICANT CHANGE UP (ref 0–0.5)
EOSINOPHIL NFR BLD AUTO: 1.3 % — SIGNIFICANT CHANGE UP (ref 0–6)
GLUCOSE SERPL-MCNC: 84 MG/DL — SIGNIFICANT CHANGE UP (ref 70–99)
HCT VFR BLD CALC: 44.1 % — SIGNIFICANT CHANGE UP (ref 34.5–45)
HGB BLD-MCNC: 15 G/DL — SIGNIFICANT CHANGE UP (ref 11.5–15.5)
IMM GRANULOCYTES # BLD AUTO: 0.01 K/UL — SIGNIFICANT CHANGE UP (ref 0–0.07)
IMM GRANULOCYTES NFR BLD AUTO: 0.1 % — SIGNIFICANT CHANGE UP (ref 0–0.9)
INR BLD: 1 RATIO — SIGNIFICANT CHANGE UP (ref 0.85–1.16)
LG PLATELETS BLD QL AUTO: SLIGHT — SIGNIFICANT CHANGE UP
LYMPHOCYTES # BLD AUTO: 1.89 K/UL — SIGNIFICANT CHANGE UP (ref 1–3.3)
LYMPHOCYTES NFR BLD AUTO: 25.4 % — SIGNIFICANT CHANGE UP (ref 13–44)
MANUAL SMEAR VERIFICATION: SIGNIFICANT CHANGE UP
MCHC RBC-ENTMCNC: 28.8 PG — SIGNIFICANT CHANGE UP (ref 27–34)
MCHC RBC-ENTMCNC: 34 G/DL — SIGNIFICANT CHANGE UP (ref 32–36)
MCV RBC AUTO: 84.8 FL — SIGNIFICANT CHANGE UP (ref 80–100)
MONOCYTES # BLD AUTO: 0.85 K/UL — SIGNIFICANT CHANGE UP (ref 0–0.9)
MONOCYTES NFR BLD AUTO: 11.4 % — SIGNIFICANT CHANGE UP (ref 2–14)
NEUTROPHILS # BLD AUTO: 4.58 K/UL — SIGNIFICANT CHANGE UP (ref 1.8–7.4)
NEUTROPHILS NFR BLD AUTO: 61.5 % — SIGNIFICANT CHANGE UP (ref 43–77)
NRBC # BLD AUTO: 0 K/UL — SIGNIFICANT CHANGE UP (ref 0–0)
NRBC # FLD: 0 K/UL — SIGNIFICANT CHANGE UP (ref 0–0)
NRBC BLD AUTO-RTO: 0 /100 WBCS — SIGNIFICANT CHANGE UP (ref 0–0)
PLAT MORPH BLD: ABNORMAL
PLATELET # BLD AUTO: 166 K/UL — SIGNIFICANT CHANGE UP (ref 150–400)
PMV BLD: 13.1 FL — HIGH (ref 7–13)
POTASSIUM SERPL-MCNC: 4 MMOL/L — SIGNIFICANT CHANGE UP (ref 3.5–5.3)
POTASSIUM SERPL-SCNC: 4 MMOL/L — SIGNIFICANT CHANGE UP (ref 3.5–5.3)
PROT SERPL-MCNC: 6.9 GM/DL — SIGNIFICANT CHANGE UP (ref 6–8.3)
PROTHROM AB SERPL-ACNC: 11.5 SEC — SIGNIFICANT CHANGE UP (ref 9.9–13.4)
RBC # BLD: 5.2 M/UL — SIGNIFICANT CHANGE UP (ref 3.8–5.2)
RBC # FLD: 13.2 % — SIGNIFICANT CHANGE UP (ref 10.3–14.5)
RBC BLD AUTO: NORMAL — SIGNIFICANT CHANGE UP
SODIUM SERPL-SCNC: 139 MMOL/L — SIGNIFICANT CHANGE UP (ref 135–145)
WBC # BLD: 7.45 K/UL — SIGNIFICANT CHANGE UP (ref 3.8–10.5)
WBC # FLD AUTO: 7.45 K/UL — SIGNIFICANT CHANGE UP (ref 3.8–10.5)
WBC MORPHOLOGY: NORMAL — SIGNIFICANT CHANGE UP

## 2025-04-20 PROCEDURE — 99285 EMERGENCY DEPT VISIT HI MDM: CPT

## 2025-04-20 PROCEDURE — 85730 THROMBOPLASTIN TIME PARTIAL: CPT

## 2025-04-20 PROCEDURE — 93971 EXTREMITY STUDY: CPT | Mod: LT

## 2025-04-20 PROCEDURE — 99285 EMERGENCY DEPT VISIT HI MDM: CPT | Mod: 25

## 2025-04-20 PROCEDURE — 36415 COLL VENOUS BLD VENIPUNCTURE: CPT

## 2025-04-20 PROCEDURE — 85610 PROTHROMBIN TIME: CPT

## 2025-04-20 PROCEDURE — 93971 EXTREMITY STUDY: CPT | Mod: 26,LT

## 2025-04-20 PROCEDURE — 85025 COMPLETE CBC W/AUTO DIFF WBC: CPT

## 2025-04-20 PROCEDURE — 73610 X-RAY EXAM OF ANKLE: CPT | Mod: LT

## 2025-04-20 PROCEDURE — 80053 COMPREHEN METABOLIC PANEL: CPT

## 2025-04-20 PROCEDURE — 73610 X-RAY EXAM OF ANKLE: CPT | Mod: 26,LT

## 2025-04-20 RX ORDER — APIXABAN 2.5 MG/1
10 TABLET, FILM COATED ORAL ONCE
Refills: 0 | Status: COMPLETED | OUTPATIENT
Start: 2025-04-20 | End: 2025-04-20

## 2025-04-20 RX ORDER — APIXABAN 2.5 MG/1
2 TABLET, FILM COATED ORAL
Qty: 28 | Refills: 0
Start: 2025-04-20 | End: 2025-04-26

## 2025-04-20 RX ORDER — IBUPROFEN 200 MG
400 TABLET ORAL ONCE
Refills: 0 | Status: COMPLETED | OUTPATIENT
Start: 2025-04-20 | End: 2025-04-20

## 2025-04-20 RX ADMIN — Medication 400 MILLIGRAM(S): at 10:24

## 2025-04-20 RX ADMIN — APIXABAN 10 MILLIGRAM(S): 2.5 TABLET, FILM COATED ORAL at 13:30

## 2025-04-20 NOTE — ED PROVIDER NOTE - PATIENT PORTAL LINK FT
You can access the FollowMyHealth Patient Portal offered by [facility] by registering at the following website: [email or web address]. By joining "[employer]’s FollowMyHealth portal, you will also be able to view your health information using other applications (apps) compatible with our system.

## 2025-04-20 NOTE — ED PROVIDER NOTE - NSPTACCESSSVCSAPPT_ED_ALL_ED
Paxlovid sent to patient's pharmacy. Please tell her to take half-tab amlodipine during treatment and for 3 days after her last dose; she can then increase to full tab at that time.   Specialty Care (immediate)...

## 2025-04-20 NOTE — ED ADULT NURSE NOTE - NSFALLRISKINTERV_ED_ALL_ED
Assistance OOB with selected safe patient handling equipment if applicable/Assistance with ambulation/Communicate fall risk and risk factors to all staff, patient, and family/Monitor gait and stability/Provide visual cue: yellow wristband, yellow gown, etc/Reinforce activity limits and safety measures with patient and family/Call bell, personal items and telephone in reach/Instruct patient to call for assistance before getting out of bed/chair/stretcher/Non-slip footwear applied when patient is off stretcher/[place] to call system/Physically safe environment - no spills, clutter or unnecessary equipment/Purposeful Proactive Rounding/Room/bathroom lighting operational, light cord in reach

## 2025-04-20 NOTE — ED ADULT TRIAGE NOTE - CHIEF COMPLAINT QUOTE
pt presented to the er c/o left lower extremity pain beginning friday. pain radiates from calf to ankle. denies trauma to area, blood thinner use, pain medications prior to arrival. hx hypertension

## 2025-04-20 NOTE — ED ADULT NURSE NOTE - COVID-19 ORDERING FACILITY
[facility] Topical Sulfur Applications Pregnancy And Lactation Text: This medication is Pregnancy Category C and has an unknown safety profile during pregnancy. It is unknown if this topical medication is excreted in breast milk.

## 2025-04-20 NOTE — ED PROVIDER NOTE - PHYSICAL EXAMINATION
Gen'l: -American female adult in NAD, no respiratory discomfort, no sentence shortening, not acutely ill.  Head: NC/AT  Eyes: PERRL, EOMI  ENT: O/P clear, mm slightly dry  CV: RRR, normal radial pulse  Lungs: CTA, normal respirations  GI: soft, NT, BS+  : Deferred, no flank nor CVAT  Neck: NT, supple w/o pain, no stiffness nor meningismus  MSK: , B/L SLR 35 degrees w/o pain, normal motor. back of knee slight soft tissue tenderness, including mild popliteal w/o mass or swelling. Calf no discoloration or tactile warmth slight soft tissue tenderness. L knee NT no effusion, no discoloration, stable. L ankle mild tenderness no swelling or discoloration, no tactile warmth, good AROM, joint stable. L foot DP pulse normal. LT present but diffusely less than right foot and toes. CR normal. motor intact  Skin: no tactile warmth, no rash  Neuro: A+O x 4, CN 2 -12 intact, normal speech, no focal motor/sensory deficits 300 Gen'l: -American female adult in NAD, no respiratory discomfort, no sentence shortening, not acutely ill.  Head: NC/AT  Eyes: PERRL, EOMI  ENT: O/P clear, mm slightly dry  CV: RRR, normal radial pulse  Lungs: CTA, normal respirations  GI: soft, NT, BS+  : Deferred, no flank nor CVAT  Neck: NT, supple w/o pain  MSK:  B/L SLR 35 degrees w/o pain, normal motor.   L popliteal fossa: slight soft tissue tenderness w/o mass/swelling/discoloration.   L Calf: no discoloration or tactile warmth, + mild swelling, + slight soft tissue tenderness, soft tissues + soft to palpation.   L knee: NT, no effusion, no discoloration, jt. + stable.   L ankle: mild tenderness, no swelling nor discoloration, no tactile warmth, good AROM, joint stable.  L foot: DP pulse normal. LT present but diffusely less than right foot and toes. CR normal. motor intact  Skin: no tactile warmth, no rash  Neuro: A+O x 4, CN 2 -12 intact, normal speech, no focal motor/sensory deficits

## 2025-04-20 NOTE — ED ADULT NURSE NOTE - OBJECTIVE STATEMENT
patient ambulatory to ED c/o left sided lower leg pain.  recently hospitalized, not currently on anticoagulation.  reports pain from behind knee radiating down leg to ankle.  reports pain started 2 days ago.  no pain meds PTA.

## 2025-04-20 NOTE — ED ADULT NURSE NOTE - IN THE PAST 12 MONTHS HAVE YOU USED DRUGS OTHER THAN THOSE REQUIRED FOR MEDICAL REASON?
----- Message from Eduarda Barriga MD sent at 11/5/2018  7:21 PM CST -----  Both ultrasounds were negative. No hernia seen   No

## 2025-04-20 NOTE — ED PROVIDER NOTE - CLINICAL SUMMARY MEDICAL DECISION MAKING FREE TEXT BOX
65 y/o F with PMHx of HTN, glaucoma presents to the ED c/o LLE pain radiating from calf to ankle starting Friday after she left ED. Recent 3 day  admission, Tenderness and mildly swollen L calf, mild discomfort L ankle no swelling no discoloration, joint stable. Plan x-ray L ankle, PO Motrin, venous doppler LLE, observe reassess 63 y/o F with PMHx of HTN, glaucoma presents to the ED c/o LLE pain radiating from calf to ankle starting Friday after she left ED. Recent 3 day  admission, Tenderness and mildly swollen L calf, mild discomfort L ankle no swelling no discoloration, joint stable.     Plan x-ray L ankle, PO Motrin, venous doppler LLE, observe reassess    CC:  XR wet read: negative.  LLE venous Doppler report + acute DVT.  Labs results not actionable.  Pt started on Eliquis 10 mg po.  pt &  at bedside informed of all study results, expressed their understanding, & agree with Dc home on po Eliquis, PCP and/or AC office f/u. 65 y/o F, PMHx of HTN, glaucoma; BIB pvt car to the ED c/o lower LLE pain radiating from calf to ankle starting 4/18 after HH DC, +3 day HH admit for dizziness, r/o CVA (inpt w/u negative).  Exam: NAD, no respir. discomfort. +Tender and mildly swollen L calf, mild discomfort L ankle w/o swelling nor discoloration, joint stable.     Clinical concern incl: LLE DVT, L ankle injury.  Plan: x-ray L ankle, PO Motrin, venous doppler LLE, observe reassess    CC:  XR wet read: negative.  LLE venous Doppler report + acute DVT.  Labs results not actionable.  Pt started on Eliquis 10 mg po.  Pt &  at bedside informed of all study results, expressed their understanding, & agree with Dc home on po Eliquis, PCP and/or AC office f/u.

## 2025-04-20 NOTE — ED PROVIDER NOTE - NSFOLLOWUPINSTRUCTIONS_ED_ALL_ED_FT
You have a clot in your left lower leg.  Consequently you are being started on blood thinner (anticoagulation) medication, Eliquis 10 mg 1 pill twice a day for 7 days.  You will need to continue the Eliquis further (5 mg 2 x a day), but this will be for your primary doctor to facilitate.  Tylenol 325 mg 2 tablets every 6 hours as needed for pain/discomfort.  Follow-up this week with your primary doctor or [facility] ([phone]) for re-evaluation and to facilitate further Eliquis dosing.  Return to ED if fever, chest pain, shortness of breath, feeling lightheaded, passing out, or any other major concern.      Deep Vein Thrombosis  A person's legs with close-ups showing a normal vein, a vein with a blood clot, and a blood clot that breaks loose.  Deep vein thrombosis (DVT) is a condition in which a blood clot forms in a vein of the deep venous system. This can occur in the lower leg, thigh, pelvis, arm, or neck. A clot is blood that has thickened into a gel or solid. This condition is serious and can be life-threatening if the clot travels to the arteries of the lungs and causes a blockage (pulmonary embolism). A DVT can also damage veins in the leg, which can lead to long-term venous disease, leg pain, swelling, discoloration, and ulcers or sores (post-thrombotic syndrome).    What are the causes?  This condition may be caused by:  A slowdown of blood flow.  Damage to a vein.  A condition that causes blood to clot more easily, such as certain bleeding disorders.  What increases the risk?  The following factors may make you more likely to develop this condition:  Obesity.  Being older, especially older than age 60.  Being inactive or not moving around (sedentary lifestyle). This may include:  Sitting or lying down for longer than 4–6 hours other than to sleep at night.  Being in the hospital, or having major or lengthy surgery.  Having any recent bone injuries, such as breaks (fractures), that reduce movement, especially in the lower extremities.  Having recent orthopedic surgery on the lower extremities.  Being pregnant, giving birth, or having recently given birth.  Taking medicines that contain estrogen, such as birth control or hormone replacement therapy.  Using products that contain nicotine or tobacco, especially if you use hormonal birth control.  Having a history of a blood vessel disease (peripheral vascular disease) or congestive heart disease.  Having a history of cancer, especially if being treated with chemotherapy.  What are the signs or symptoms?  Symptoms of this condition include:  Swelling, pain, pressure, or tenderness in an arm or a leg.  An arm or a leg becoming warm, red, or discolored.  A leg turning very pale or blue. You may have a large DVT. This is rare.  If the clot is in your leg, you may notice that symptoms get worse when you stand or walk.    In some cases, there are no symptoms.    How is this diagnosed?  This condition is diagnosed with:  Your medical history and a physical exam.  Tests, such as:  Blood tests to check how well your blood clots.  Doppler ultrasound. This is the best way to find a DVT.  CT venogram. Contrast dye is injected into a vein, and X-rays are taken to check for clots. This is helpful for veins in the chest or pelvis.  How is this treated?  Treatment for this condition depends on:  The cause of your DVT.  The size and location of your DVT, or having more than one DVT.  Your risk for bleeding or developing more clots.  Other medical conditions you may have.  Treatment may include:  Taking a blood thinner medicine (anticoagulant) to prevent more clots from forming or current clots from growing.  Wearing compression stockings.  Injecting medicines into the affected vein to break up the clot (catheter-directed thrombolysis).  Surgical procedures, when DVT is severe or hard to treat. These may be done to:  Isolate and remove your clot.  Place an inferior vena cava (IVC) filter. This filter is placed into a large vein called the inferior vena cava to catch blood clots before they reach your lungs.  You may get some medical treatments for 6 months or longer.    Follow these instructions at home:  If you are taking blood thinners:    Talk with your health care provider before you take any medicines that contain aspirin or NSAIDs, such as ibuprofen. These medicines increase your risk for dangerous bleeding.  Take your medicine exactly as told, at the same time every day. Do not skip a dose. Do not take more than the prescribed dose. This is important.  Ask your health care provider about foods and medicines that could change or interact with the way your blood thinner works. Avoid these foods and medicines if you are told to do so.  Avoid anything that may cause bleeding or bruising. You may bleed more easily while taking blood thinners.  Be very careful when using knives, scissors, or other sharp objects.  Use an electric razor instead of a blade.  Avoid activities that could cause injury or bruising, and follow instructions for preventing falls.  Tell your health care provider if you have had any internal bleeding, bleeding ulcers, or neurologic diseases, such as strokes or cerebral aneurysms.  Wear a medical alert bracelet or carry a card that lists what medicines you take.  General instructions    Take over-the-counter and prescription medicines only as told by your health care provider.  Return to your normal activities as told by your health care provider. Ask your health care provider what activities are safe for you.  If recommended, wear compression stockings as told by your health care provider. These stockings help to prevent blood clots and reduce swelling in your legs. Never wear your compression stockings while sleeping at night.  Keep all follow-up visits. This is important.  Where to find more information  American Heart Association: www.heart.org  Centers for Disease Control and Prevention: www.cdc.gov  National Heart, Lung, and Blood [place]: www.nhlbi.nih.gov  Contact a health care provider if:  You miss a dose of your blood thinner.  You have unusual bruising or other color changes.  You have new or worse pain, swelling, or redness in an arm or a leg.  You have worsening numbness or tingling in an arm or a leg.  You have a significant color change (pale or blue) in the extremity that has the DVT.  Get help right away if:  You have signs or symptoms that a blood clot has moved to the lungs. These may include:  Shortness of breath.  Chest pain.  Fast or irregular heartbeats (palpitations).  Light-headedness, dizziness, or fainting.  Coughing up blood.  You have signs or symptoms that your blood is too thin. These may include:  Blood in your vomit, stool, or urine.  A cut that will not stop bleeding.  A menstrual period that is heavier than usual.  A severe headache or confusion.  These symptoms may be an emergency. Get help right away. Call 911.  Do not wait to see if the symptoms will go away.  Do not drive yourself to the hospital.  Summary  Deep vein thrombosis (DVT) happens when a blood clot forms in a deep vein. This may occur in the lower leg, thigh, pelvis, arm, or neck.  Symptoms affect the arm or leg and can include swelling, pain, tenderness, warmth, redness, or discoloration.  This condition may be treated with medicines. In severe cases, a procedure or surgery may be done to remove or dissolve the clots.  If you are taking blood thinners, take them exactly as told. Do not skip a dose. Do not take more than is prescribed.  Get help right away if you have a severe headache, shortness of breath, chest pain, fast or irregular heartbeats, or blood in your vomit, urine, or stool.  This information is not intended to replace advice given to you by your health care provider. Make sure you discuss any questions you have with your health care provider.

## 2025-04-20 NOTE — ED PROVIDER NOTE - OBJECTIVE STATEMENT
65 y/o F with PMHx of HTN, glaucoma presents to the ED c/o LLE pain radiating from calf to ankle starting Friday after she left ED.  admission April 15-17 2025 for dizziness, colon. NIHSS 0, radio imaging inc MRI negative. Dx unclear possible migraine +/- vertigo. Pt reports having felt slight during admission but pain became more noticeable at home. Pt uses blood pressure med.  Pt has no pain in left ankle or knee joints. Pain described as severe and throbbing, aggravated by depended position and movement/ambulation. Pt denies fever, loss of appetite, chest pain, numbness, SOB, swelling, discoloration. Dr. Schwartz PCP. 65 y/o F with PMHx of HTN, glaucoma; BIB pvt car to the ED c/o LLE pain radiating from calf to ankle starting 4/18 after HH DC.  admission 4/15-17/2025 for dizziness, HA, blurred vision: NIHSS=0, radio-imaging incl. MRI negative, Dx unclear: vertigo, possible migraine. Pt reports having felt slight lower LLE discomfort during admission but pain became more noticeable at home.  Pt has no pain in left ankle nor knee joints. Pain described as severe and throbbing, aggravated by dependent position and movement/ambulation. Pt denies fever, loss of appetite, chest pain, distal LLE weak/numb, SOB, LLE swelling/discoloration.   PCP: Dr. Corley.

## 2025-04-23 DIAGNOSIS — R42 DIZZINESS AND GIDDINESS: ICD-10-CM

## 2025-04-23 DIAGNOSIS — Z91.040 LATEX ALLERGY STATUS: ICD-10-CM

## 2025-04-23 DIAGNOSIS — H40.9 UNSPECIFIED GLAUCOMA: ICD-10-CM

## 2025-04-23 DIAGNOSIS — G43.819 OTHER MIGRAINE, INTRACTABLE, WITHOUT STATUS MIGRAINOSUS: ICD-10-CM

## 2025-04-23 DIAGNOSIS — I10 ESSENTIAL (PRIMARY) HYPERTENSION: ICD-10-CM

## 2025-04-23 DIAGNOSIS — Z88.0 ALLERGY STATUS TO PENICILLIN: ICD-10-CM
